# Patient Record
Sex: FEMALE | Race: WHITE | NOT HISPANIC OR LATINO | Employment: FULL TIME | URBAN - METROPOLITAN AREA
[De-identification: names, ages, dates, MRNs, and addresses within clinical notes are randomized per-mention and may not be internally consistent; named-entity substitution may affect disease eponyms.]

---

## 2021-07-07 ENCOUNTER — TELEPHONE (OUTPATIENT)
Dept: SCHEDULING | Facility: IMAGING CENTER | Age: 61
End: 2021-07-07

## 2021-07-23 ENCOUNTER — TELEPHONE (OUTPATIENT)
Dept: MEDICAL GROUP | Facility: MEDICAL CENTER | Age: 61
End: 2021-07-23

## 2021-07-23 NOTE — TELEPHONE ENCOUNTER
Phone Number Called: [phone]    Call outcome: Left detailed message for patient. Informed to call back with any additional questions.    Message: Reminding of apt

## 2021-07-26 ENCOUNTER — HOSPITAL ENCOUNTER (OUTPATIENT)
Facility: MEDICAL CENTER | Age: 61
End: 2021-07-26
Attending: FAMILY MEDICINE
Payer: COMMERCIAL

## 2021-07-26 ENCOUNTER — OFFICE VISIT (OUTPATIENT)
Dept: MEDICAL GROUP | Facility: MEDICAL CENTER | Age: 61
End: 2021-07-26
Payer: COMMERCIAL

## 2021-07-26 VITALS
SYSTOLIC BLOOD PRESSURE: 130 MMHG | DIASTOLIC BLOOD PRESSURE: 80 MMHG | OXYGEN SATURATION: 96 % | HEIGHT: 64 IN | RESPIRATION RATE: 16 BRPM | BODY MASS INDEX: 36.89 KG/M2 | TEMPERATURE: 97.3 F | WEIGHT: 216.05 LBS | HEART RATE: 63 BPM

## 2021-07-26 DIAGNOSIS — Z12.11 SCREENING FOR COLORECTAL CANCER: ICD-10-CM

## 2021-07-26 DIAGNOSIS — Z00.00 ANNUAL PHYSICAL EXAM: ICD-10-CM

## 2021-07-26 DIAGNOSIS — Z12.31 ENCOUNTER FOR SCREENING MAMMOGRAM FOR BREAST CANCER: ICD-10-CM

## 2021-07-26 DIAGNOSIS — Z12.4 SCREENING FOR MALIGNANT NEOPLASM OF CERVIX: ICD-10-CM

## 2021-07-26 DIAGNOSIS — Z13.220 SCREENING FOR LIPID DISORDERS: ICD-10-CM

## 2021-07-26 DIAGNOSIS — E66.09 CLASS 2 OBESITY DUE TO EXCESS CALORIES WITHOUT SERIOUS COMORBIDITY WITH BODY MASS INDEX (BMI) OF 37.0 TO 37.9 IN ADULT: ICD-10-CM

## 2021-07-26 DIAGNOSIS — Z12.12 SCREENING FOR COLORECTAL CANCER: ICD-10-CM

## 2021-07-26 DIAGNOSIS — Z13.0 SCREENING FOR DEFICIENCY ANEMIA: ICD-10-CM

## 2021-07-26 DIAGNOSIS — Z13.1 SCREENING FOR DIABETES MELLITUS: ICD-10-CM

## 2021-07-26 DIAGNOSIS — Z11.59 NEED FOR HEPATITIS C SCREENING TEST: ICD-10-CM

## 2021-07-26 PROCEDURE — 88175 CYTOPATH C/V AUTO FLUID REDO: CPT

## 2021-07-26 PROCEDURE — 99386 PREV VISIT NEW AGE 40-64: CPT | Performed by: FAMILY MEDICINE

## 2021-07-26 PROCEDURE — 87624 HPV HI-RISK TYP POOLED RSLT: CPT

## 2021-07-26 PROCEDURE — 99000 SPECIMEN HANDLING OFFICE-LAB: CPT | Performed by: FAMILY MEDICINE

## 2021-07-26 ASSESSMENT — ENCOUNTER SYMPTOMS
EYE DISCHARGE: 0
VOMITING: 0
DIZZINESS: 0
EYE PAIN: 0
HEMOPTYSIS: 0
EYE REDNESS: 0
CONSTIPATION: 0
WEIGHT LOSS: 0
HEADACHES: 0
DIARRHEA: 0
PALPITATIONS: 0
FEVER: 0
WHEEZING: 0
DEPRESSION: 0
NAUSEA: 0
CHILLS: 0
COUGH: 0
SINUS PAIN: 0
ABDOMINAL PAIN: 0
SENSORY CHANGE: 0
MYALGIAS: 0
SPUTUM PRODUCTION: 0
NERVOUS/ANXIOUS: 0
SHORTNESS OF BREATH: 0
FOCAL WEAKNESS: 0

## 2021-07-26 ASSESSMENT — PATIENT HEALTH QUESTIONNAIRE - PHQ9: CLINICAL INTERPRETATION OF PHQ2 SCORE: 0

## 2021-07-26 NOTE — PROGRESS NOTES
60 y.o. female here to establish care and annual routine gynecologic exam.    Chief Complaint.  Chief Complaint   Patient presents with   • Establish Care       History of present illness:    Ob-Gyn/ History:    OB History    Para Term  AB Living   0 0 0 0 0 0   SAB TAB Ectopic Molar Multiple Live Births   0 0 0 0 0 0        Last Pap Smear: 3 years  History of abnormal pap smears. None  Gyn Surgery: None  Sexual history: currently sexually active    Menopause at 38    Health Maintenance  Advanced directive: n/a  Osteoporosis Screen/ DEXA: n/a  PT/vit D for falls prevention: n/a   Cholesterol Screening: Ordered lipid panel  Diabetes Screening: Ordered A1c  AAA Screening (65 to 74 year male): n/a   Aspirin Use: n/a   Diet: She eats healthy diet  Exercise: She is physically active  Substance Abuse: None  Safe in relationship. Yes  Seat belts, bike helmet, gun safety discussed.  Sun protection used.    Cancer screening  Colorectal Cancer Screening: Due for colon cancer screening.  She reports that she was getting stool test before.  She never had colonoscopy.  Would like to do stool test.  Ordered Cologuard  Lung Cancer Screening: She does not smoke  Cervical Cancer Screening: Pap smear done today  Breast Cancer Screening: Ordered mammogram      Infectious disease screening/Immunizations  --STI Screening: None  --Practices safe sex.  --Immunizations: Due for tetanus and shingles vaccine.      Review of systems:    Review of Systems   Constitutional: Negative for chills, fever, malaise/fatigue and weight loss.   HENT: Negative for ear discharge, ear pain, hearing loss and sinus pain.    Eyes: Negative for pain, discharge and redness.   Respiratory: Negative for cough, hemoptysis, sputum production, shortness of breath and wheezing.    Cardiovascular: Negative for chest pain, palpitations and leg swelling.   Gastrointestinal: Negative for abdominal pain, constipation, diarrhea, nausea and vomiting.  "  Genitourinary: Negative for dysuria, frequency and urgency.   Musculoskeletal: Negative for joint pain and myalgias.   Skin: Negative for itching and rash.   Neurological: Negative for dizziness, sensory change, focal weakness and headaches.   Endo/Heme/Allergies: Negative for environmental allergies.   Psychiatric/Behavioral: Negative for depression. The patient is not nervous/anxious.           Past Medical, Surgical and Family History:    Past Medical History:   Diagnosis Date   • Asthma      Past Surgical History:   Procedure Laterality Date   • TONSILLECTOMY       Family History   Problem Relation Age of Onset   • Hypertension Mother    • Hypertension Father    • Parkinson's Disease Father    • Colon Cancer Father         in late 60s        Social History:    Social History     Tobacco Use   • Smoking status: Never Smoker   • Smokeless tobacco: Never Used   Substance Use Topics   • Alcohol use: Yes     Comment: 1 to 2 drinks a year   • Drug use: Never        Medications and Allergies:     No current outpatient medications on file.     No current facility-administered medications for this visit.        Not on File    Vitals:    /80   Pulse 63   Temp 36.3 °C (97.3 °F)   Resp 16   Ht 1.626 m (5' 4\")   Wt 98 kg (216 lb 0.8 oz)   SpO2 96%  Body mass index is 37.09 kg/m².    Physical Exam:   Physical Exam  Constitutional:       General: She is not in acute distress.  HENT:      Head: Normocephalic and atraumatic.      Right Ear: Ear canal and external ear normal. There is no impacted cerumen.      Left Ear: Ear canal and external ear normal. There is no impacted cerumen.   Eyes:      Conjunctiva/sclera: Conjunctivae normal.   Cardiovascular:      Rate and Rhythm: Normal rate and regular rhythm.      Heart sounds: Normal heart sounds. No murmur heard.   No friction rub. No gallop.    Pulmonary:      Effort: Pulmonary effort is normal. No respiratory distress.      Breath sounds: Normal breath sounds. No " wheezing or rales.   Musculoskeletal:         General: No deformity.      Cervical back: Neck supple.   Skin:     General: Skin is warm.      Findings: No rash.   Neurological:      General: No focal deficit present.      Mental Status: She is alert. Mental status is at baseline.      Gait: Gait is intact.   Psychiatric:         Mood and Affect: Mood and affect normal.         Judgment: Judgment normal.        Medical assistant Fátima present in room during pelvic exam.  Pelvic Exam -  Normal external genitalia with no lesions. Vaginal Mucosa:  normal vaginal mucosa . Cervix with no visible lesions. No cervical motion tenderness. Uterus is normal sized with no masses. No adnexal tenderness or enlargement appreciated. Thin Prep Pap is obtained, vaginal swab is obtained and specimen(s) sent to lab.        Assessment/Plan:    Trevon was seen today for establish care.    Diagnoses and all orders for this visit:    Annual physical exam  Class 2 obesity due to excess calories without serious comorbidity with body mass index (BMI) of 37.0 to 37.9 in adult  Eat healthy diet and do aerobic exercise 150 minutes in a week.  Ordered mammogram and Cologuard.  Ordered blood work.  Discussed with patient about tetanus and shingles vaccination.  Will give at upcoming visits.    -     Comp Metabolic Panel; Future  -     HEMOGLOBIN A1C; Future  -     Patient identified as having weight management issue.  Appropriate orders and counseling given.    Encounter for screening mammogram for breast cancer  -     MA-SCREENING MAMMO BILAT W/TOMOSYNTHESIS W/CAD; Future    Screening for colorectal cancer  -     COLOGUARD (FIT DNA)    Screening for deficiency anemia  -     CBC WITHOUT DIFFERENTIAL; Future    Screening for diabetes mellitus  -     Comp Metabolic Panel; Future  -     HEMOGLOBIN A1C; Future    Screening for lipid disorders  -     Lipid Profile; Future    Need for hepatitis C screening test  -     HEP C VIRUS ANTIBODY;  Future    Screening for malignant neoplasm of cervix  -     THINPREP PAP WITH HPV; Future        Follow up in 2 to 3 months for follow-up for blood work.

## 2021-07-27 LAB
CYTOLOGY REG CYTOL: NORMAL
HPV HR 12 DNA CVX QL NAA+PROBE: NEGATIVE
HPV16 DNA SPEC QL NAA+PROBE: NEGATIVE
HPV18 DNA SPEC QL NAA+PROBE: NEGATIVE
SPECIMEN SOURCE: NORMAL

## 2021-07-28 ENCOUNTER — TELEPHONE (OUTPATIENT)
Dept: MEDICAL GROUP | Facility: MEDICAL CENTER | Age: 61
End: 2021-07-28

## 2021-07-28 NOTE — TELEPHONE ENCOUNTER
VOIC[employer]  1. Caller Name: [email or web address] (Spouse)                        Call Back Number: [phone]    2. Message: Spouse left message requesting pt's recent Pap results. Pt is unable to get into [employer] account.     Tried to call back spouse, left message for spouse to call back at [phone].    Called pt, she was able to view results on [employer].

## 2021-08-12 ENCOUNTER — HOSPITAL ENCOUNTER (OUTPATIENT)
Dept: RADIOLOGY | Facility: MEDICAL CENTER | Age: 61
End: 2021-08-12
Attending: FAMILY MEDICINE
Payer: COMMERCIAL

## 2021-08-12 DIAGNOSIS — Z12.31 ENCOUNTER FOR SCREENING MAMMOGRAM FOR BREAST CANCER: ICD-10-CM

## 2021-08-12 PROCEDURE — 77063 BREAST TOMOSYNTHESIS BI: CPT

## 2021-08-19 ENCOUNTER — TELEPHONE (OUTPATIENT)
Dept: MEDICAL GROUP | Facility: MEDICAL CENTER | Age: 61
End: 2021-08-19

## 2021-08-19 NOTE — TELEPHONE ENCOUNTER
Spoke to patient about results per PCP request. Patient has already spoken to imaging department about schedule. They are waiting for results from [facility] to compare before redoing the imaging.     ----- Message from Sherwin Campos M.D. sent at 8/19/2021 12:34 PM PDT -----  Please call patient regarding abnormal mammogram results.  It showed calcification in the left breast and recommendation is to do further imaging.  I ordered diagnostic mammogram.  Please call patient to schedule that.

## 2021-08-23 ENCOUNTER — HOSPITAL ENCOUNTER (OUTPATIENT)
Dept: LAB | Facility: MEDICAL CENTER | Age: 61
End: 2021-08-23
Attending: FAMILY MEDICINE
Payer: COMMERCIAL

## 2021-08-23 DIAGNOSIS — Z13.220 SCREENING FOR LIPID DISORDERS: ICD-10-CM

## 2021-08-23 DIAGNOSIS — Z11.59 NEED FOR HEPATITIS C SCREENING TEST: ICD-10-CM

## 2021-08-23 DIAGNOSIS — Z13.0 SCREENING FOR DEFICIENCY ANEMIA: ICD-10-CM

## 2021-08-23 DIAGNOSIS — E66.09 CLASS 2 OBESITY DUE TO EXCESS CALORIES WITHOUT SERIOUS COMORBIDITY WITH BODY MASS INDEX (BMI) OF 37.0 TO 37.9 IN ADULT: ICD-10-CM

## 2021-08-23 DIAGNOSIS — Z13.1 SCREENING FOR DIABETES MELLITUS: ICD-10-CM

## 2021-08-23 LAB
ALBUMIN SERPL BCP-MCNC: 4.2 G/DL (ref 3.2–4.9)
ALBUMIN/GLOB SERPL: 1.2 G/DL
ALP SERPL-CCNC: 56 U/L (ref 30–99)
ALT SERPL-CCNC: 24 U/L (ref 2–50)
ANION GAP SERPL CALC-SCNC: 13 MMOL/L (ref 7–16)
AST SERPL-CCNC: 28 U/L (ref 12–45)
BILIRUB SERPL-MCNC: 0.3 MG/DL (ref 0.1–1.5)
BUN SERPL-MCNC: 14 MG/DL (ref 8–22)
CALCIUM SERPL-MCNC: 9.5 MG/DL (ref 8.5–10.5)
CHLORIDE SERPL-SCNC: 104 MMOL/L (ref 96–112)
CHOLEST SERPL-MCNC: 225 MG/DL (ref 100–199)
CO2 SERPL-SCNC: 24 MMOL/L (ref 20–33)
CREAT SERPL-MCNC: 0.97 MG/DL (ref 0.5–1.4)
ERYTHROCYTE [DISTWIDTH] IN BLOOD BY AUTOMATED COUNT: 43.7 FL (ref 35.9–50)
EST. AVERAGE GLUCOSE BLD GHB EST-MCNC: 108 MG/DL
FASTING STATUS PATIENT QL REPORTED: NORMAL
GLOBULIN SER CALC-MCNC: 3.5 G/DL (ref 1.9–3.5)
GLUCOSE SERPL-MCNC: 88 MG/DL (ref 65–99)
HBA1C MFR BLD: 5.4 % (ref 4–5.6)
HCT VFR BLD AUTO: 45.5 % (ref 37–47)
HCV AB SER QL: NORMAL
HDLC SERPL-MCNC: 39 MG/DL
HGB BLD-MCNC: 15.1 G/DL (ref 12–16)
LDLC SERPL CALC-MCNC: 165 MG/DL
MCH RBC QN AUTO: 30.7 PG (ref 27–33)
MCHC RBC AUTO-ENTMCNC: 33.2 G/DL (ref 33.6–35)
MCV RBC AUTO: 92.5 FL (ref 81.4–97.8)
PLATELET # BLD AUTO: 262 K/UL (ref 164–446)
PMV BLD AUTO: 9.9 FL (ref 9–12.9)
POTASSIUM SERPL-SCNC: 4.3 MMOL/L (ref 3.6–5.5)
PROT SERPL-MCNC: 7.7 G/DL (ref 6–8.2)
RBC # BLD AUTO: 4.92 M/UL (ref 4.2–5.4)
SODIUM SERPL-SCNC: 141 MMOL/L (ref 135–145)
TRIGL SERPL-MCNC: 107 MG/DL (ref 0–149)
WBC # BLD AUTO: 6.7 K/UL (ref 4.8–10.8)

## 2021-08-23 PROCEDURE — 36415 COLL VENOUS BLD VENIPUNCTURE: CPT

## 2021-08-23 PROCEDURE — 86803 HEPATITIS C AB TEST: CPT

## 2021-08-23 PROCEDURE — 80061 LIPID PANEL: CPT

## 2021-08-23 PROCEDURE — 85027 COMPLETE CBC AUTOMATED: CPT

## 2021-08-23 PROCEDURE — 83036 HEMOGLOBIN GLYCOSYLATED A1C: CPT

## 2021-08-23 PROCEDURE — 80053 COMPREHEN METABOLIC PANEL: CPT

## 2021-09-20 ENCOUNTER — HOSPITAL ENCOUNTER (OUTPATIENT)
Dept: RADIOLOGY | Facility: MEDICAL CENTER | Age: 61
End: 2021-09-20
Attending: FAMILY MEDICINE
Payer: COMMERCIAL

## 2021-09-20 ENCOUNTER — TELEPHONE (OUTPATIENT)
Dept: MEDICAL GROUP | Facility: MEDICAL CENTER | Age: 61
End: 2021-09-20

## 2021-09-20 DIAGNOSIS — R92.8 ABNORMAL MAMMOGRAM: ICD-10-CM

## 2021-09-20 DIAGNOSIS — R92.1 CALCIFICATION OF LEFT BREAST ON MAMMOGRAPHY: ICD-10-CM

## 2021-09-20 PROCEDURE — 77065 DX MAMMO INCL CAD UNI: CPT | Mod: LT

## 2021-09-21 ENCOUNTER — TELEPHONE (OUTPATIENT)
Dept: MEDICAL GROUP | Facility: MEDICAL CENTER | Age: 61
End: 2021-09-21

## 2021-09-21 NOTE — TELEPHONE ENCOUNTER
----- Message from Sherwin Campos M.D. sent at 9/20/2021 10:57 AM PDT -----  Please call patient regarding abnormal mammogram results.  It showed 2 areas of microcalcifications in left breast which needs stereotactic biopsy.  I ordered stereotactic biopsy.  Please recommend to call at [phone] to schedule for this biopsy.

## 2021-09-21 NOTE — TELEPHONE ENCOUNTER
Called pt to discuss mammogram results per PCP request.   Pt stated she had a biopsy done last year on these calcifications. Stated that radiology department is trying to get copies of her results from [facility], where she had last biopsy. Pt would like to hold off on scheduling further biopsy until radiology can obtain previous results. Pt educated about importance of biopsy, pt verbalized understanding. Pt had no further questions at this time. Advised pt to call if she had any further questions.

## 2021-10-13 ENCOUNTER — HOSPITAL ENCOUNTER (OUTPATIENT)
Dept: RADIOLOGY | Facility: MEDICAL CENTER | Age: 61
End: 2021-10-13
Payer: COMMERCIAL

## 2021-10-19 ENCOUNTER — TELEPHONE (OUTPATIENT)
Dept: MEDICAL GROUP | Facility: MEDICAL CENTER | Age: 61
End: 2021-10-19

## 2021-10-19 DIAGNOSIS — R92.1 CALCIFICATION OF LEFT BREAST ON MAMMOGRAPHY: ICD-10-CM

## 2021-10-19 NOTE — TELEPHONE ENCOUNTER
Spoke to patient about results per PCP request. She verbalized understanding. No questions at this time.    ----- Message from Sherwin Campos M.D. sent at 10/19/2021 11:27 AM PDT -----  Please call patient regarding addendum to her mammogram results.  Previous imagings were obtained and it showed calcifications were present in previous mammogram also.  Current recommendation is to do repeat left breast mammogram in 6 months and stereotactic biopsy is not recommended anymore.  I ordered repeat left breast mammogram to do in 6 months.

## 2023-01-10 ENCOUNTER — OFFICE VISIT (OUTPATIENT)
Dept: MEDICAL GROUP | Facility: MEDICAL CENTER | Age: 63
End: 2023-01-10
Payer: COMMERCIAL

## 2023-01-10 VITALS
SYSTOLIC BLOOD PRESSURE: 138 MMHG | WEIGHT: 222.66 LBS | OXYGEN SATURATION: 99 % | DIASTOLIC BLOOD PRESSURE: 70 MMHG | TEMPERATURE: 97.4 F | RESPIRATION RATE: 16 BRPM | BODY MASS INDEX: 39.45 KG/M2 | HEIGHT: 63 IN | HEART RATE: 66 BPM

## 2023-01-10 DIAGNOSIS — E78.2 MIXED HYPERLIPIDEMIA: ICD-10-CM

## 2023-01-10 DIAGNOSIS — Z11.4 SCREENING FOR HIV (HUMAN IMMUNODEFICIENCY VIRUS): ICD-10-CM

## 2023-01-10 DIAGNOSIS — R21 RASH: ICD-10-CM

## 2023-01-10 DIAGNOSIS — Z12.31 ENCOUNTER FOR SCREENING MAMMOGRAM FOR MALIGNANT NEOPLASM OF BREAST: ICD-10-CM

## 2023-01-10 PROCEDURE — 99214 OFFICE O/P EST MOD 30 MIN: CPT | Performed by: FAMILY MEDICINE

## 2023-01-10 RX ORDER — METHYLPREDNISOLONE 4 MG/1
TABLET ORAL
Qty: 21 TABLET | Refills: 0 | Status: SHIPPED | OUTPATIENT
Start: 2023-01-10 | End: 2023-02-10

## 2023-01-10 RX ORDER — TRIAMCINOLONE ACETONIDE 1 MG/G
1 CREAM TOPICAL 2 TIMES DAILY
Qty: 45 G | Refills: 2 | Status: SHIPPED | OUTPATIENT
Start: 2023-01-10 | End: 2023-02-10 | Stop reason: SDUPTHER

## 2023-01-10 ASSESSMENT — ENCOUNTER SYMPTOMS
CHILLS: 0
PALPITATIONS: 0
FEVER: 0

## 2023-01-10 ASSESSMENT — FIBROSIS 4 INDEX: FIB4 SCORE: 1.4

## 2023-01-10 ASSESSMENT — PATIENT HEALTH QUESTIONNAIRE - PHQ9: CLINICAL INTERPRETATION OF PHQ2 SCORE: 0

## 2023-01-10 NOTE — PROGRESS NOTES
"FAMILY MEDICINE VISIT                                                               Chief complaint::Diagnoses of Rash, Mixed hyperlipidemia, Encounter for screening mammogram for malignant neoplasm of breast, and Screening for HIV (human immunodeficiency virus) were pertinent to this visit.    History of present illness: Trevon Smith is a 62 y.o. female who presented for rash.    Problem   Mixed Hyperlipidemia    Last lipid panel showed elevated total cholesterol, LDL elevation also.  HDL is mildly low at 39.    Lab Results   Component Value Date/Time    CHOLSTRLTOT 225 (H) 08/23/2021 1117    TRIGLYCERIDE 107 08/23/2021 1117    HDL 39 (A) 08/23/2021 1117     (H) 08/23/2021 1117        Rash    She started experiencing rash a month ago.  Rash is at lower back which is extending towards front area.  She has rash over neck, head and right tibia.  Rash is red, itchy.  Denies using any new cream, soap, shampoo, detergent.  Has been using over-the-counter anti-itch medication which has been helping mildly with these symptoms.  Denies any similar previous rash            Review of systems:     Review of Systems   Constitutional:  Negative for chills and fever.   Cardiovascular:  Negative for chest pain, palpitations and leg swelling.   Skin:  Positive for itching and rash.      Medications and Allergies:     Current Outpatient Medications   Medication Sig Dispense Refill    methylPREDNISolone (MEDROL DOSEPAK) 4 MG Tablet Therapy Pack As directed on the packaging label. 21 Tablet 0    triamcinolone acetonide (KENALOG) 0.1 % Cream Apply 1 Application topically 2 times a day. 45 g 2     No current facility-administered medications for this visit.          Vitals:    /70   Pulse 66   Temp 36.3 °C (97.4 °F)   Resp 16   Ht 1.6 m (5' 3\")   Wt 101 kg (222 lb 10.6 oz)   SpO2 99%  Body mass index is 39.44 kg/m².    Physical Exam:     Physical Exam  Constitutional:       General: She is not in acute " distress.  HENT:      Head: Normocephalic and atraumatic.      Right Ear: External ear normal.      Left Ear: External ear normal.   Eyes:      Conjunctiva/sclera: Conjunctivae normal.   Cardiovascular:      Rate and Rhythm: Normal rate.   Pulmonary:      Effort: Pulmonary effort is normal. No respiratory distress.   Musculoskeletal:         General: No deformity.      Cervical back: Neck supple.   Skin:     Comments: Diffuse erythematous patches scratch marks over right lower leg, lower back, neck area.  Diffuse erythematous patches at scalp also   Neurological:      Mental Status: She is alert.      Gait: Gait is intact.   Psychiatric:         Mood and Affect: Mood and affect normal.         Judgment: Judgment normal.        Labs:  I reviewed with patient recent labs resulted on 8/23/2021    Assessment/Plan:         Problem List Items Addressed This Visit       Rash     New problem, unstable, appears allergic reaction, start Medrol Dosepak and prescribed Kenalog cream to be used twice daily at lower back and at right tibia.  Discussed if symptoms are not getting better, will refer her to dermatology.  Recommend to use Benadryl cream for itching         Relevant Medications    methylPREDNISolone (MEDROL DOSEPAK) 4 MG Tablet Therapy Pack    triamcinolone acetonide (KENALOG) 0.1 % Cream    Mixed hyperlipidemia     Chronic problem, unstable, healthy diet and exercise.  Recheck labs.         Relevant Orders    Comp Metabolic Panel    Lipid Profile     Other Visit Diagnoses       Encounter for screening mammogram for malignant neoplasm of breast        Relevant Orders    MA-SCREENING MAMMO BILAT W/TOMOSYNTHESIS W/CAD    Screening for HIV (human immunodeficiency virus)        Relevant Orders    HIV AG/AB COMBO ASSAY SCREENING           She had Cologuard test done, request records.  Discussed with patient about due vaccines including tetanus, shingles, flu vaccine.  She does not want to get any vaccines today.    Please  note that this dictation was created using voice recognition software. I have made every reasonable attempt to correct obvious errors, but I expect that there are errors of grammar and possibly content that I did not discover before finalizing the note.    Follow up in 1 month for lab follow-up and rash follow-up

## 2023-01-10 NOTE — ASSESSMENT & PLAN NOTE
New problem, unstable, appears allergic reaction, start Medrol Dosepak and prescribed Kenalog cream to be used twice daily at lower back and at right tibia.  Discussed if symptoms are not getting better, will refer her to dermatology.  Recommend to use Benadryl cream for itching

## 2023-01-23 ENCOUNTER — HOSPITAL ENCOUNTER (OUTPATIENT)
Dept: RADIOLOGY | Facility: MEDICAL CENTER | Age: 63
End: 2023-01-23
Attending: FAMILY MEDICINE
Payer: COMMERCIAL

## 2023-01-23 DIAGNOSIS — Z12.31 ENCOUNTER FOR SCREENING MAMMOGRAM FOR MALIGNANT NEOPLASM OF BREAST: ICD-10-CM

## 2023-01-23 PROCEDURE — 77063 BREAST TOMOSYNTHESIS BI: CPT

## 2023-02-03 ENCOUNTER — HOSPITAL ENCOUNTER (OUTPATIENT)
Dept: LAB | Facility: MEDICAL CENTER | Age: 63
End: 2023-02-03
Attending: FAMILY MEDICINE
Payer: COMMERCIAL

## 2023-02-03 DIAGNOSIS — Z11.4 SCREENING FOR HIV (HUMAN IMMUNODEFICIENCY VIRUS): ICD-10-CM

## 2023-02-03 DIAGNOSIS — E78.2 MIXED HYPERLIPIDEMIA: ICD-10-CM

## 2023-02-03 LAB
ALBUMIN SERPL BCP-MCNC: 4.1 G/DL (ref 3.2–4.9)
ALBUMIN/GLOB SERPL: 1.4 G/DL
ALP SERPL-CCNC: 55 U/L (ref 30–99)
ALT SERPL-CCNC: 21 U/L (ref 2–50)
ANION GAP SERPL CALC-SCNC: 10 MMOL/L (ref 7–16)
AST SERPL-CCNC: 24 U/L (ref 12–45)
BILIRUB SERPL-MCNC: 0.3 MG/DL (ref 0.1–1.5)
BUN SERPL-MCNC: 18 MG/DL (ref 8–22)
CALCIUM ALBUM COR SERPL-MCNC: 9.2 MG/DL (ref 8.5–10.5)
CALCIUM SERPL-MCNC: 9.3 MG/DL (ref 8.5–10.5)
CHLORIDE SERPL-SCNC: 107 MMOL/L (ref 96–112)
CHOLEST SERPL-MCNC: 212 MG/DL (ref 100–199)
CO2 SERPL-SCNC: 25 MMOL/L (ref 20–33)
CREAT SERPL-MCNC: 0.9 MG/DL (ref 0.5–1.4)
FASTING STATUS PATIENT QL REPORTED: NORMAL
GFR SERPLBLD CREATININE-BSD FMLA CKD-EPI: 71 ML/MIN/1.73 M 2
GLOBULIN SER CALC-MCNC: 3 G/DL (ref 1.9–3.5)
GLUCOSE SERPL-MCNC: 95 MG/DL (ref 65–99)
HDLC SERPL-MCNC: 41 MG/DL
LDLC SERPL CALC-MCNC: 155 MG/DL
POTASSIUM SERPL-SCNC: 4.8 MMOL/L (ref 3.6–5.5)
PROT SERPL-MCNC: 7.1 G/DL (ref 6–8.2)
SODIUM SERPL-SCNC: 142 MMOL/L (ref 135–145)
TRIGL SERPL-MCNC: 82 MG/DL (ref 0–149)

## 2023-02-03 PROCEDURE — 80053 COMPREHEN METABOLIC PANEL: CPT

## 2023-02-03 PROCEDURE — 87389 HIV-1 AG W/HIV-1&-2 AB AG IA: CPT

## 2023-02-03 PROCEDURE — 80061 LIPID PANEL: CPT

## 2023-02-03 PROCEDURE — 36415 COLL VENOUS BLD VENIPUNCTURE: CPT

## 2023-02-07 LAB — HIV 1+2 AB+HIV1 P24 AG SERPL QL IA: NORMAL

## 2023-02-10 ENCOUNTER — OFFICE VISIT (OUTPATIENT)
Dept: MEDICAL GROUP | Facility: MEDICAL CENTER | Age: 63
End: 2023-02-10
Payer: COMMERCIAL

## 2023-02-10 VITALS
WEIGHT: 228.18 LBS | HEIGHT: 64 IN | BODY MASS INDEX: 38.96 KG/M2 | RESPIRATION RATE: 12 BRPM | SYSTOLIC BLOOD PRESSURE: 130 MMHG | HEART RATE: 81 BPM | TEMPERATURE: 99 F | DIASTOLIC BLOOD PRESSURE: 70 MMHG | OXYGEN SATURATION: 97 %

## 2023-02-10 DIAGNOSIS — Z12.11 SCREENING FOR COLORECTAL CANCER: ICD-10-CM

## 2023-02-10 DIAGNOSIS — E78.2 MIXED HYPERLIPIDEMIA: ICD-10-CM

## 2023-02-10 DIAGNOSIS — Z12.12 SCREENING FOR COLORECTAL CANCER: ICD-10-CM

## 2023-02-10 DIAGNOSIS — R21 RASH: ICD-10-CM

## 2023-02-10 PROCEDURE — 99214 OFFICE O/P EST MOD 30 MIN: CPT | Performed by: FAMILY MEDICINE

## 2023-02-10 RX ORDER — TRIAMCINOLONE ACETONIDE 1 MG/G
1 CREAM TOPICAL 2 TIMES DAILY
Qty: 45 G | Refills: 2 | Status: SHIPPED | OUTPATIENT
Start: 2023-02-10

## 2023-02-10 ASSESSMENT — ENCOUNTER SYMPTOMS
PALPITATIONS: 0
CHILLS: 0
FEVER: 0

## 2023-02-10 ASSESSMENT — FIBROSIS 4 INDEX: FIB4 SCORE: 1.28

## 2023-02-10 NOTE — ASSESSMENT & PLAN NOTE
Chronic ongoing problem, unstable, although improved, seems to be allergic reaction, referral to dermatology placed if rash is not getting better

## 2023-02-10 NOTE — ASSESSMENT & PLAN NOTE
Chronic problem, unstable, although improving, continue to work on improving exercise and eat healthy diet.  Recheck labs in 6 months.

## 2023-02-10 NOTE — PROGRESS NOTES
FAMILY MEDICINE VISIT                                                               Chief complaint::Diagnoses of Rash, Mixed hyperlipidemia, and Screening for colorectal cancer were pertinent to this visit.    History of present illness: Trevon Smith is a 62 y.o. female who presented for lab follow-up and rash follow-up.    Problem   Mixed Hyperlipidemia    Recent labs showed improvement in total cholesterol and LDL level.  HDL improved to 41.  She reports that she eats healthy diet, she is going to increase her physical activity in next few months.      Lab Results   Component Value Date/Time    CHOLSTRLTOT 212 (H) 02/03/2023 0610    TRIGLYCERIDE 82 02/03/2023 0610    HDL 41 02/03/2023 0610     (H) 02/03/2023 0610        Rash    She started experiencing rash a month ago.  Rash is at lower back which is extending towards front area.  She has rash over neck, head and right tibia.  Rash is red, itchy.  Denies using any new cream, soap, shampoo, detergent.  Has been using over-the-counter anti-itch medication which has been helping mildly with these symptoms.  Denies any similar previous rash.    At last visit I prescribed her Medrol Dosepak and Kenalog.  She reports that her symptoms are improving but she still has a rash at her right tibia and in her lower back.  She looked into all the things at home and she has not tried any new detergent soap, new cream, shampoo.        Review of systems:     Review of Systems   Constitutional:  Negative for chills, fever and malaise/fatigue.   Cardiovascular:  Negative for chest pain, palpitations and leg swelling.   Skin:  Positive for itching and rash.      Medications and Allergies:     Current Outpatient Medications   Medication Sig Dispense Refill    triamcinolone acetonide (KENALOG) 0.1 % Cream Apply 1 Application topically 2 times a day. 45 g 2     No current facility-administered medications for this visit.          Vitals:    /70   Pulse 81   Temp  "37.2 °C (99 °F) (Temporal)   Resp 12   Ht 1.613 m (5' 3.5\")   Wt 104 kg (228 lb 2.8 oz)   SpO2 97%  Body mass index is 39.79 kg/m².    Physical Exam:     Physical Exam  Constitutional:       Appearance: Normal appearance. She is well-developed and well-groomed.   HENT:      Head: Normocephalic and atraumatic.      Right Ear: External ear normal.      Left Ear: External ear normal.   Eyes:      Conjunctiva/sclera: Conjunctivae normal.   Cardiovascular:      Rate and Rhythm: Normal rate.   Pulmonary:      Effort: Pulmonary effort is normal. No respiratory distress.   Musculoskeletal:      Cervical back: Neck supple.   Skin:     Findings: Rash present.      Comments: Diffuse erythematous patches over right tibia and maculopapular rash over lower back   Neurological:      Mental Status: She is alert.      Gait: Gait is intact.   Psychiatric:         Mood and Affect: Mood and affect normal.         Judgment: Judgment normal.        Labs:  I reviewed with patient recent labs resulted on 2/3/2023    Assessment/Plan:         Problem List Items Addressed This Visit       Rash     Chronic ongoing problem, unstable, although improved, seems to be allergic reaction, referral to dermatology placed if rash is not getting better         Relevant Medications    triamcinolone acetonide (KENALOG) 0.1 % Cream    Other Relevant Orders    Referral to Dermatology    Mixed hyperlipidemia     Chronic problem, unstable, although improving, continue to work on improving exercise and eat healthy diet.  Recheck labs in 6 months.         Relevant Orders    Comp Metabolic Panel    Lipid Profile     Other Visit Diagnoses       Screening for colorectal cancer        Relevant Orders    SANA (FIT DNA)           Discussed with patient about due vaccines, she does not want to get any vaccines today.    Please note that this dictation was created using voice recognition software. I have made every reasonable attempt to correct obvious errors, " but I expect that there are errors of grammar and possibly content that I did not discover before finalizing the note.    Follow up in 6 months for lab follow up.

## 2023-02-23 ENCOUNTER — APPOINTMENT (RX ONLY)
Dept: URBAN - METROPOLITAN AREA CLINIC 31 | Facility: CLINIC | Age: 63
Setting detail: DERMATOLOGY
End: 2023-02-23

## 2023-02-23 DIAGNOSIS — L85.3 XEROSIS CUTIS: ICD-10-CM

## 2023-02-23 DIAGNOSIS — I87.2 VENOUS INSUFFICIENCY (CHRONIC) (PERIPHERAL): ICD-10-CM

## 2023-02-23 DIAGNOSIS — L73.9 FOLLICULAR DISORDER, UNSPECIFIED: ICD-10-CM

## 2023-02-23 DIAGNOSIS — L738 OTHER SPECIFIED DISEASES OF HAIR AND HAIR FOLLICLES: ICD-10-CM

## 2023-02-23 DIAGNOSIS — L663 OTHER SPECIFIED DISEASES OF HAIR AND HAIR FOLLICLES: ICD-10-CM

## 2023-02-23 PROBLEM — L02.425 FURUNCLE OF RIGHT LOWER LIMB: Status: ACTIVE | Noted: 2023-02-23

## 2023-02-23 PROCEDURE — 99203 OFFICE O/P NEW LOW 30 MIN: CPT

## 2023-02-23 PROCEDURE — ? COUNSELING

## 2023-02-23 PROCEDURE — ? ADDITIONAL NOTES

## 2023-02-23 PROCEDURE — ? PRESCRIPTION

## 2023-02-23 RX ORDER — CEPHALEXIN 250 MG/1
TABLET ORAL
Qty: 42 | Refills: 0 | Status: ERX | COMMUNITY
Start: 2023-02-23

## 2023-02-23 RX ORDER — TRIAMCINOLONE ACETONIDE 1 MG/G
CREAM TOPICAL BID
Qty: 454 | Refills: 3 | Status: ERX | COMMUNITY
Start: 2023-02-23

## 2023-02-23 RX ADMIN — TRIAMCINOLONE ACETONIDE: 1 CREAM TOPICAL at 00:00

## 2023-02-23 RX ADMIN — CEPHALEXIN: 250 TABLET ORAL at 00:00

## 2023-02-23 ASSESSMENT — LOCATION DETAILED DESCRIPTION DERM
LOCATION DETAILED: RIGHT DISTAL PRETIBIAL REGION
LOCATION DETAILED: RIGHT MEDIAL UPPER BACK
LOCATION DETAILED: RIGHT PROXIMAL PRETIBIAL REGION

## 2023-02-23 ASSESSMENT — LOCATION ZONE DERM
LOCATION ZONE: TRUNK
LOCATION ZONE: LEG

## 2023-02-23 ASSESSMENT — LOCATION SIMPLE DESCRIPTION DERM
LOCATION SIMPLE: RIGHT PRETIBIAL REGION
LOCATION SIMPLE: RIGHT UPPER BACK

## 2023-02-23 NOTE — PROCEDURE: ADDITIONAL NOTES
Detail Level: Detailed
Render Risk Assessment In Note?: no
Additional Notes: Return in two weeks for follow up.\\nApply triamcinolone cream twice daily to itchy spots on back
Additional Notes: Wear compression stockings.\\nApply triamcinolone cream on red itchy spots.\\nApply moisturizer twice daily

## 2023-03-01 ENCOUNTER — PATIENT MESSAGE (OUTPATIENT)
Dept: HEALTH INFORMATION MANAGEMENT | Facility: OTHER | Age: 63
End: 2023-03-01

## 2023-03-24 ENCOUNTER — APPOINTMENT (RX ONLY)
Dept: URBAN - METROPOLITAN AREA CLINIC 31 | Facility: CLINIC | Age: 63
Setting detail: DERMATOLOGY
End: 2023-03-24

## 2023-03-24 DIAGNOSIS — L738 OTHER SPECIFIED DISEASES OF HAIR AND HAIR FOLLICLES: ICD-10-CM

## 2023-03-24 DIAGNOSIS — L73.9 FOLLICULAR DISORDER, UNSPECIFIED: ICD-10-CM

## 2023-03-24 DIAGNOSIS — I87.2 VENOUS INSUFFICIENCY (CHRONIC) (PERIPHERAL): ICD-10-CM

## 2023-03-24 DIAGNOSIS — L663 OTHER SPECIFIED DISEASES OF HAIR AND HAIR FOLLICLES: ICD-10-CM

## 2023-03-24 DIAGNOSIS — L85.3 XEROSIS CUTIS: ICD-10-CM

## 2023-03-24 PROBLEM — L02.821 FURUNCLE OF HEAD [ANY PART, EXCEPT FACE]: Status: ACTIVE | Noted: 2023-03-24

## 2023-03-24 PROCEDURE — ? COUNSELING

## 2023-03-24 PROCEDURE — ? PRESCRIPTION

## 2023-03-24 PROCEDURE — 99213 OFFICE O/P EST LOW 20 MIN: CPT

## 2023-03-24 PROCEDURE — ? ADDITIONAL NOTES

## 2023-03-24 RX ORDER — CEPHALEXIN 250 MG/1
TABLET ORAL
Qty: 42 | Refills: 3 | Status: ERX

## 2023-03-24 RX ORDER — CLINDAMYCIN PHOSPHATE 10 MG/G
GEL TOPICAL
Qty: 60 | Refills: 11 | Status: ERX | COMMUNITY
Start: 2023-03-24

## 2023-03-24 RX ADMIN — CLINDAMYCIN PHOSPHATE: 10 GEL TOPICAL at 00:00

## 2023-03-24 ASSESSMENT — LOCATION SIMPLE DESCRIPTION DERM
LOCATION SIMPLE: RIGHT UPPER BACK
LOCATION SIMPLE: SCALP
LOCATION SIMPLE: RIGHT PRETIBIAL REGION

## 2023-03-24 ASSESSMENT — LOCATION DETAILED DESCRIPTION DERM
LOCATION DETAILED: LEFT SUPERIOR PARIETAL SCALP
LOCATION DETAILED: RIGHT MEDIAL UPPER BACK
LOCATION DETAILED: LEFT CENTRAL POSTAURICULAR SKIN
LOCATION DETAILED: RIGHT DISTAL PRETIBIAL REGION

## 2023-03-24 ASSESSMENT — LOCATION ZONE DERM
LOCATION ZONE: LEG
LOCATION ZONE: TRUNK
LOCATION ZONE: SCALP

## 2023-03-24 NOTE — PROCEDURE: ADDITIONAL NOTES
Render Risk Assessment In Note?: no
Detail Level: Zone
Additional Notes: Clindagel to scalp bid prn flares, Keflex if flares do not respond to clindagel.
Additional Notes: Wear compression stockings.\\nApply triamcinolone cream on red itchy spots.\\nApply moisturizer twice daily
Detail Level: Detailed

## 2023-08-14 PROBLEM — U07.1 COVID-19: Status: ACTIVE | Noted: 2023-08-14

## 2023-08-25 ENCOUNTER — HOSPITAL ENCOUNTER (OUTPATIENT)
Dept: LAB | Facility: MEDICAL CENTER | Age: 63
End: 2023-08-25
Attending: FAMILY MEDICINE
Payer: COMMERCIAL

## 2023-08-25 DIAGNOSIS — E78.2 MIXED HYPERLIPIDEMIA: ICD-10-CM

## 2023-08-25 LAB
ALBUMIN SERPL BCP-MCNC: 4 G/DL (ref 3.2–4.9)
ALBUMIN/GLOB SERPL: 1.3 G/DL
ALP SERPL-CCNC: 51 U/L (ref 30–99)
ALT SERPL-CCNC: 19 U/L (ref 2–50)
ANION GAP SERPL CALC-SCNC: 12 MMOL/L (ref 7–16)
AST SERPL-CCNC: 27 U/L (ref 12–45)
BILIRUB SERPL-MCNC: 0.6 MG/DL (ref 0.1–1.5)
BUN SERPL-MCNC: 13 MG/DL (ref 8–22)
CALCIUM ALBUM COR SERPL-MCNC: 9.1 MG/DL (ref 8.5–10.5)
CALCIUM SERPL-MCNC: 9.1 MG/DL (ref 8.5–10.5)
CHLORIDE SERPL-SCNC: 104 MMOL/L (ref 96–112)
CHOLEST SERPL-MCNC: 185 MG/DL (ref 100–199)
CO2 SERPL-SCNC: 25 MMOL/L (ref 20–33)
CREAT SERPL-MCNC: 1.02 MG/DL (ref 0.5–1.4)
FASTING STATUS PATIENT QL REPORTED: NORMAL
GFR SERPLBLD CREATININE-BSD FMLA CKD-EPI: 61 ML/MIN/1.73 M 2
GLOBULIN SER CALC-MCNC: 3.2 G/DL (ref 1.9–3.5)
GLUCOSE SERPL-MCNC: 85 MG/DL (ref 65–99)
HDLC SERPL-MCNC: 30 MG/DL
LDLC SERPL CALC-MCNC: 129 MG/DL
POTASSIUM SERPL-SCNC: 3.8 MMOL/L (ref 3.6–5.5)
PROT SERPL-MCNC: 7.2 G/DL (ref 6–8.2)
SODIUM SERPL-SCNC: 141 MMOL/L (ref 135–145)
TRIGL SERPL-MCNC: 128 MG/DL (ref 0–149)

## 2023-08-25 PROCEDURE — 36415 COLL VENOUS BLD VENIPUNCTURE: CPT

## 2023-08-25 PROCEDURE — 80053 COMPREHEN METABOLIC PANEL: CPT

## 2023-08-25 PROCEDURE — 80061 LIPID PANEL: CPT

## 2023-09-05 ENCOUNTER — OFFICE VISIT (OUTPATIENT)
Dept: MEDICAL GROUP | Facility: MEDICAL CENTER | Age: 63
End: 2023-09-05
Payer: COMMERCIAL

## 2023-09-05 VITALS
HEART RATE: 63 BPM | WEIGHT: 216.05 LBS | DIASTOLIC BLOOD PRESSURE: 80 MMHG | HEIGHT: 63 IN | SYSTOLIC BLOOD PRESSURE: 152 MMHG | OXYGEN SATURATION: 95 % | BODY MASS INDEX: 38.28 KG/M2 | RESPIRATION RATE: 17 BRPM | TEMPERATURE: 97.5 F

## 2023-09-05 DIAGNOSIS — R94.4 DECREASED GFR: ICD-10-CM

## 2023-09-05 DIAGNOSIS — R03.0 ELEVATED BLOOD PRESSURE READING: ICD-10-CM

## 2023-09-05 DIAGNOSIS — N39.3 STRESS INCONTINENCE: ICD-10-CM

## 2023-09-05 DIAGNOSIS — E78.2 MIXED HYPERLIPIDEMIA: ICD-10-CM

## 2023-09-05 PROBLEM — U07.1 COVID-19: Status: RESOLVED | Noted: 2023-08-14 | Resolved: 2023-09-05

## 2023-09-05 PROCEDURE — 3077F SYST BP >= 140 MM HG: CPT | Performed by: FAMILY MEDICINE

## 2023-09-05 PROCEDURE — 99214 OFFICE O/P EST MOD 30 MIN: CPT | Performed by: FAMILY MEDICINE

## 2023-09-05 PROCEDURE — 3079F DIAST BP 80-89 MM HG: CPT | Performed by: FAMILY MEDICINE

## 2023-09-05 ASSESSMENT — ENCOUNTER SYMPTOMS
PALPITATIONS: 0
CHILLS: 0
FEVER: 0

## 2023-09-05 NOTE — ASSESSMENT & PLAN NOTE
New problem, unstable, recommended to monitor blood pressure daily for 1 month at home and bring log at next visit.  Reduce salt intake in diet, drink plenty of water, exercise 150 minutes in a week.

## 2023-09-05 NOTE — ASSESSMENT & PLAN NOTE
Chronic problem, mildly elevated at the level and low HDL.  Numbers are improving when compared to previous numbers.  Eat healthy diet and exercise 150 minutes in a week

## 2023-09-05 NOTE — PROGRESS NOTES
"FAMILY MEDICINE VISIT                                                               Chief complaint::Diagnoses of Mixed hyperlipidemia, Decreased GFR, Elevated blood pressure reading, and Stress incontinence were pertinent to this visit.    History of present illness: Trevon Smith is a 62 y.o. female who presented for lab follow up.    Problem   Decreased Gfr    Recent filtration rate came back at 61 which reduced from previous numbers.  She reports that she drinks a lot of fluids in a day but does have tea in that.     Elevated Blood Pressure Reading    Blood pressure is elevated today at 152/80, repeat came back almost the same also.  She has a lot of stress going on at home.  She is taking care of her uncle who has brain cancer.  She does not have any symptoms.     Stress Incontinence    She reports that she gets urinary incontinence whenever she coughs or sneezes.  Does not have any other urinary symptoms.     Mixed Hyperlipidemia    Recent labs showed improvement in total cholesterol and LDL level.  HDL low at 30.    Lab Results   Component Value Date/Time    CHOLSTRLTOT 185 08/25/2023 0610    TRIGLYCERIDE 128 08/25/2023 0610    HDL 30 (A) 08/25/2023 0610     (H) 08/25/2023 0610          Review of systems:     Review of Systems   Constitutional:  Negative for chills, fever and malaise/fatigue.   Cardiovascular:  Negative for chest pain, palpitations and leg swelling.        Medications and Allergies:     Current Outpatient Medications   Medication Sig Dispense Refill    triamcinolone acetonide (KENALOG) 0.1 % Cream Apply 1 Application topically 2 times a day. 45 g 2     No current facility-administered medications for this visit.          Vitals:    BP (!) 152/80   Pulse 63   Temp 36.4 °C (97.5 °F)   Resp 17   Ht 1.6 m (5' 3\")   Wt 98 kg (216 lb 0.8 oz)   SpO2 95%  Body mass index is 38.27 kg/m².    Physical Exam:     Physical Exam  Constitutional:       Appearance: Normal appearance. She is " well-developed and well-groomed.   HENT:      Head: Normocephalic and atraumatic.      Right Ear: External ear normal.      Left Ear: External ear normal.   Eyes:      General:         Right eye: No discharge.         Left eye: No discharge.      Conjunctiva/sclera: Conjunctivae normal.   Cardiovascular:      Rate and Rhythm: Normal rate.   Pulmonary:      Effort: Pulmonary effort is normal. No respiratory distress.   Musculoskeletal:      Cervical back: Neck supple.   Skin:     Findings: No rash.   Neurological:      Mental Status: She is alert.   Psychiatric:         Mood and Affect: Mood and affect normal.         Behavior: Behavior normal.          Labs:  I reviewed with patient recent labs resulted on 8/25/2023    Assessment/Plan:         Problem List Items Addressed This Visit       Mixed hyperlipidemia     Chronic problem, mildly elevated at the level and low HDL.  Numbers are improving when compared to previous numbers.  Eat healthy diet and exercise 150 minutes in a week         Relevant Orders    Lipid Profile    Decreased GFR     New problem, borderline filtration rate, recommended patient to drink more water than tea.  Recheck labs in 6-month.         Relevant Orders    Comp Metabolic Panel    Elevated blood pressure reading     New problem, unstable, recommended to monitor blood pressure daily for 1 month at home and bring log at next visit.  Reduce salt intake in diet, drink plenty of water, exercise 150 minutes in a week.         Stress incontinence     New problem, unstable, symptoms are consistent with stress incontinence.  Recommended patient to do Kegel exercises.  If not getting better then we will send her to urogynecology.          We discussed about due vaccines, she would like to hold onto vaccines currently as she has a lot going on.    Please note that this dictation was created using voice recognition software. I have made every reasonable attempt to correct obvious errors, but I expect  that there are errors of grammar and possibly content that I did not discover before finalizing the note.    Follow up in 1 month for BP follow up and in 6 month for lab follow up.

## 2023-09-05 NOTE — ASSESSMENT & PLAN NOTE
New problem, borderline filtration rate, recommended patient to drink more water than tea.  Recheck labs in 6-month.

## 2023-09-05 NOTE — ASSESSMENT & PLAN NOTE
New problem, unstable, symptoms are consistent with stress incontinence.  Recommended patient to do Kegel exercises.  If not getting better then we will send her to urogynecology.

## 2023-10-06 ENCOUNTER — APPOINTMENT (OUTPATIENT)
Dept: MEDICAL GROUP | Facility: MEDICAL CENTER | Age: 63
End: 2023-10-06
Payer: COMMERCIAL

## 2023-10-26 ENCOUNTER — HOSPITAL ENCOUNTER (OUTPATIENT)
Dept: LAB | Facility: MEDICAL CENTER | Age: 63
End: 2023-10-26
Attending: FAMILY MEDICINE
Payer: COMMERCIAL

## 2023-10-26 DIAGNOSIS — E78.2 MIXED HYPERLIPIDEMIA: ICD-10-CM

## 2023-10-26 DIAGNOSIS — R94.4 DECREASED GFR: ICD-10-CM

## 2023-10-26 LAB
ALBUMIN SERPL BCP-MCNC: 4.2 G/DL (ref 3.2–4.9)
ALBUMIN/GLOB SERPL: 1.2 G/DL
ALP SERPL-CCNC: 55 U/L (ref 30–99)
ALT SERPL-CCNC: 17 U/L (ref 2–50)
ANION GAP SERPL CALC-SCNC: 10 MMOL/L (ref 7–16)
AST SERPL-CCNC: 25 U/L (ref 12–45)
BILIRUB SERPL-MCNC: 0.4 MG/DL (ref 0.1–1.5)
BUN SERPL-MCNC: 17 MG/DL (ref 8–22)
CALCIUM ALBUM COR SERPL-MCNC: 9.3 MG/DL (ref 8.5–10.5)
CALCIUM SERPL-MCNC: 9.5 MG/DL (ref 8.5–10.5)
CHLORIDE SERPL-SCNC: 106 MMOL/L (ref 96–112)
CHOLEST SERPL-MCNC: 209 MG/DL (ref 100–199)
CO2 SERPL-SCNC: 26 MMOL/L (ref 20–33)
CREAT SERPL-MCNC: 1.07 MG/DL (ref 0.5–1.4)
FASTING STATUS PATIENT QL REPORTED: NORMAL
GFR SERPLBLD CREATININE-BSD FMLA CKD-EPI: 58 ML/MIN/1.73 M 2
GLOBULIN SER CALC-MCNC: 3.4 G/DL (ref 1.9–3.5)
GLUCOSE SERPL-MCNC: 91 MG/DL (ref 65–99)
HDLC SERPL-MCNC: 36 MG/DL
LDLC SERPL CALC-MCNC: 152 MG/DL
POTASSIUM SERPL-SCNC: 4.8 MMOL/L (ref 3.6–5.5)
PROT SERPL-MCNC: 7.6 G/DL (ref 6–8.2)
SODIUM SERPL-SCNC: 142 MMOL/L (ref 135–145)
TRIGL SERPL-MCNC: 103 MG/DL (ref 0–149)

## 2023-10-26 PROCEDURE — 80061 LIPID PANEL: CPT

## 2023-10-26 PROCEDURE — 80053 COMPREHEN METABOLIC PANEL: CPT

## 2023-10-26 PROCEDURE — 36415 COLL VENOUS BLD VENIPUNCTURE: CPT

## 2023-11-07 ENCOUNTER — OFFICE VISIT (OUTPATIENT)
Dept: MEDICAL GROUP | Facility: MEDICAL CENTER | Age: 63
End: 2023-11-07
Payer: COMMERCIAL

## 2023-11-07 VITALS
TEMPERATURE: 97.9 F | HEIGHT: 63 IN | SYSTOLIC BLOOD PRESSURE: 150 MMHG | BODY MASS INDEX: 37.5 KG/M2 | HEART RATE: 61 BPM | OXYGEN SATURATION: 97 % | WEIGHT: 211.64 LBS | DIASTOLIC BLOOD PRESSURE: 80 MMHG | RESPIRATION RATE: 16 BRPM

## 2023-11-07 DIAGNOSIS — Z13.820 ENCOUNTER FOR OSTEOPOROSIS SCREENING IN ASYMPTOMATIC POSTMENOPAUSAL PATIENT: ICD-10-CM

## 2023-11-07 DIAGNOSIS — N18.31 STAGE 3A CHRONIC KIDNEY DISEASE: ICD-10-CM

## 2023-11-07 DIAGNOSIS — E78.2 MIXED HYPERLIPIDEMIA: ICD-10-CM

## 2023-11-07 DIAGNOSIS — I10 PRIMARY HYPERTENSION: ICD-10-CM

## 2023-11-07 DIAGNOSIS — Z78.0 ENCOUNTER FOR OSTEOPOROSIS SCREENING IN ASYMPTOMATIC POSTMENOPAUSAL PATIENT: ICD-10-CM

## 2023-11-07 PROBLEM — N18.30 STAGE 3 CHRONIC KIDNEY DISEASE: Status: ACTIVE | Noted: 2023-09-05

## 2023-11-07 PROCEDURE — 3079F DIAST BP 80-89 MM HG: CPT | Performed by: FAMILY MEDICINE

## 2023-11-07 PROCEDURE — 99214 OFFICE O/P EST MOD 30 MIN: CPT | Performed by: FAMILY MEDICINE

## 2023-11-07 PROCEDURE — 3077F SYST BP >= 140 MM HG: CPT | Performed by: FAMILY MEDICINE

## 2023-11-07 RX ORDER — AMLODIPINE BESYLATE AND BENAZEPRIL HYDROCHLORIDE 2.5; 1 MG/1; MG/1
1 CAPSULE ORAL DAILY
Qty: 30 CAPSULE | Refills: 1 | Status: SHIPPED | OUTPATIENT
Start: 2023-11-07 | End: 2024-01-03

## 2023-11-07 RX ORDER — ROSUVASTATIN CALCIUM 10 MG/1
10 TABLET, COATED ORAL EVERY EVENING
Qty: 90 TABLET | Refills: 3 | Status: SHIPPED | OUTPATIENT
Start: 2023-11-07

## 2023-11-07 ASSESSMENT — ENCOUNTER SYMPTOMS
FEVER: 0
PALPITATIONS: 0
CHILLS: 0

## 2023-11-07 NOTE — PROGRESS NOTES
FAMILY MEDICINE VISIT                                                               Chief complaint::Diagnoses of Mixed hyperlipidemia, Stage 3a chronic kidney disease (HCC), Primary hypertension, and Encounter for osteoporosis screening in asymptomatic postmenopausal patient were pertinent to this visit.    History of present illness: Trevon Smith is a 63 y.o. female who presented for lab follow-up and blood pressure    Problem   Stage 3 Chronic Kidney Disease (Hcc)    Recent filtration rate came back at 58% which reduced from previous numbers.  She is currently not on any NSAIDs.    Lab Results   Component Value Date/Time    CREATININE 1.07 10/26/2023 06:29 AM         Primary Hypertension    Blood pressure is elevated, repeat also came back elevated at 150/80.  Her home blood pressure readings are also elevated.  She has a lot of stress going on at home.  She is taking care of her uncle who has brain cancer.     Mixed Hyperlipidemia        The 10-year ASCVD risk score (Suellen MUÑOZ, et al., 2019) is: 11.6%    Recent cholesterol panel came back elevated.     Lab Results   Component Value Date/Time    CHOLSTRLTOT 209 (H) 10/26/2023 0629    TRIGLYCERIDE 103 10/26/2023 0629    HDL 36 (A) 10/26/2023 0629     (H) 10/26/2023 0629               Review of systems:     Review of Systems   Constitutional:  Negative for chills and fever.   Cardiovascular:  Negative for chest pain, palpitations and leg swelling.        Medications and Allergies:     Current Outpatient Medications   Medication Sig Dispense Refill    amlodipine-benazepril (LOTREL) 2.5-10 MG oral capsule Take 1 Capsule by mouth every day. 30 Capsule 1    rosuvastatin (CRESTOR) 10 MG Tab Take 1 Tablet by mouth every evening. 90 Tablet 3    triamcinolone acetonide (KENALOG) 0.1 % Cream Apply 1 Application topically 2 times a day. 45 g 2     No current facility-administered medications for this visit.          Vitals:    BP (!) 150/80   Pulse 61   Temp  "36.6 °C (97.9 °F)   Resp 16   Ht 1.6 m (5' 3\")   Wt 96 kg (211 lb 10.3 oz)   SpO2 97%  Body mass index is 37.49 kg/m².    Physical Exam:     Physical Exam  Constitutional:       Appearance: Normal appearance. She is well-developed and well-groomed.   HENT:      Head: Normocephalic and atraumatic.      Right Ear: External ear normal.      Left Ear: External ear normal.   Eyes:      General:         Right eye: No discharge.         Left eye: No discharge.      Conjunctiva/sclera: Conjunctivae normal.   Cardiovascular:      Rate and Rhythm: Normal rate.   Pulmonary:      Effort: Pulmonary effort is normal. No respiratory distress.   Musculoskeletal:      Cervical back: Neck supple.   Skin:     Findings: No rash.   Neurological:      Mental Status: She is alert.   Psychiatric:         Mood and Affect: Mood and affect normal.         Behavior: Behavior normal.          Labs:  I reviewed with patient recent labs resulted on 10/26/2023    Assessment/Plan:         Problem List Items Addressed This Visit       Mixed hyperlipidemia     Chronic problem, unstable, start Crestor 10 mg daily.  Recheck labs in 4 months.         Relevant Medications    amlodipine-benazepril (LOTREL) 2.5-10 MG oral capsule    rosuvastatin (CRESTOR) 10 MG Tab    Other Relevant Orders    Lipid Profile    Stage 3 chronic kidney disease (HCC)     Chronic problem, unstable, drink more water, avoid nephrotoxic agents.  Recheck labs in 4-month         Relevant Orders    Comp Metabolic Panel    Primary hypertension     New problem, unstable, start amlodipine benazepril combination.  Follow-up in 1 to 2 month.  Monitor blood pressure at home.  Recommended DASH diet.         Relevant Medications    amlodipine-benazepril (LOTREL) 2.5-10 MG oral capsule    rosuvastatin (CRESTOR) 10 MG Tab     Other Visit Diagnoses       Encounter for osteoporosis screening in asymptomatic postmenopausal patient        Relevant Orders    DS-BONE DENSITY STUDY (DEXA)         "     Please note that this dictation was created using voice recognition software. I have made every reasonable attempt to correct obvious errors, but I expect that there are errors of grammar and possibly content that I did not discover before finalizing the note.    Follow up in 2 months for blood pressure follow-up

## 2023-11-07 NOTE — ASSESSMENT & PLAN NOTE
New problem, unstable, start amlodipine benazepril combination.  Follow-up in 1 to 2 month.  Monitor blood pressure at home.  Recommended DASH diet.

## 2024-01-03 DIAGNOSIS — I10 PRIMARY HYPERTENSION: ICD-10-CM

## 2024-01-03 RX ORDER — AMLODIPINE BESYLATE AND BENAZEPRIL HYDROCHLORIDE 2.5; 1 MG/1; MG/1
1 CAPSULE ORAL DAILY
Qty: 90 CAPSULE | Refills: 0 | Status: SHIPPED | OUTPATIENT
Start: 2024-01-03

## 2024-01-16 ENCOUNTER — OFFICE VISIT (OUTPATIENT)
Dept: MEDICAL GROUP | Facility: MEDICAL CENTER | Age: 64
End: 2024-01-16
Payer: COMMERCIAL

## 2024-01-16 VITALS
HEIGHT: 63 IN | HEART RATE: 75 BPM | DIASTOLIC BLOOD PRESSURE: 70 MMHG | SYSTOLIC BLOOD PRESSURE: 144 MMHG | WEIGHT: 213 LBS | RESPIRATION RATE: 16 BRPM | OXYGEN SATURATION: 97 % | TEMPERATURE: 97.4 F | BODY MASS INDEX: 37.74 KG/M2

## 2024-01-16 DIAGNOSIS — I10 PRIMARY HYPERTENSION: ICD-10-CM

## 2024-01-16 DIAGNOSIS — E78.2 MIXED HYPERLIPIDEMIA: ICD-10-CM

## 2024-01-16 PROCEDURE — 99214 OFFICE O/P EST MOD 30 MIN: CPT | Performed by: FAMILY MEDICINE

## 2024-01-16 PROCEDURE — 3077F SYST BP >= 140 MM HG: CPT | Performed by: FAMILY MEDICINE

## 2024-01-16 PROCEDURE — 3078F DIAST BP <80 MM HG: CPT | Performed by: FAMILY MEDICINE

## 2024-01-16 ASSESSMENT — PATIENT HEALTH QUESTIONNAIRE - PHQ9: CLINICAL INTERPRETATION OF PHQ2 SCORE: 0

## 2024-01-16 ASSESSMENT — ENCOUNTER SYMPTOMS
FEVER: 0
PALPITATIONS: 0
CHILLS: 0

## 2024-01-16 NOTE — ASSESSMENT & PLAN NOTE
Chronic problem, unstable, recommended patient to take 1-1/2 tablet to reduce blood pressure.  Goal blood pressure is between 1 20-1 30/70-80.  She will let us know if she needs refill for this prescription

## 2024-01-16 NOTE — ASSESSMENT & PLAN NOTE
Chronic problem, unstable, at last visit I started her on Crestor 10 mg daily.  I ordered labs for her to do before next visit at previous visit.  Recommended patient to do blood test before next visit.

## 2024-03-11 ENCOUNTER — HOSPITAL ENCOUNTER (OUTPATIENT)
Dept: LAB | Facility: MEDICAL CENTER | Age: 64
End: 2024-03-11
Attending: FAMILY MEDICINE
Payer: COMMERCIAL

## 2024-03-11 DIAGNOSIS — E78.2 MIXED HYPERLIPIDEMIA: ICD-10-CM

## 2024-03-11 DIAGNOSIS — N18.31 STAGE 3A CHRONIC KIDNEY DISEASE: ICD-10-CM

## 2024-03-11 LAB
ALBUMIN SERPL BCP-MCNC: 4.5 G/DL (ref 3.2–4.9)
ALBUMIN/GLOB SERPL: 1.3 G/DL
ALP SERPL-CCNC: 57 U/L (ref 30–99)
ALT SERPL-CCNC: 17 U/L (ref 2–50)
ANION GAP SERPL CALC-SCNC: 12 MMOL/L (ref 7–16)
AST SERPL-CCNC: 30 U/L (ref 12–45)
BILIRUB SERPL-MCNC: 0.4 MG/DL (ref 0.1–1.5)
BUN SERPL-MCNC: 12 MG/DL (ref 8–22)
CALCIUM ALBUM COR SERPL-MCNC: 9.2 MG/DL (ref 8.5–10.5)
CALCIUM SERPL-MCNC: 9.6 MG/DL (ref 8.5–10.5)
CHLORIDE SERPL-SCNC: 106 MMOL/L (ref 96–112)
CHOLEST SERPL-MCNC: 144 MG/DL (ref 100–199)
CO2 SERPL-SCNC: 24 MMOL/L (ref 20–33)
CREAT SERPL-MCNC: 1.02 MG/DL (ref 0.5–1.4)
GFR SERPLBLD CREATININE-BSD FMLA CKD-EPI: 61 ML/MIN/1.73 M 2
GLOBULIN SER CALC-MCNC: 3.4 G/DL (ref 1.9–3.5)
GLUCOSE SERPL-MCNC: 81 MG/DL (ref 65–99)
HDLC SERPL-MCNC: 45 MG/DL
LDLC SERPL CALC-MCNC: 81 MG/DL
POTASSIUM SERPL-SCNC: 4.4 MMOL/L (ref 3.6–5.5)
PROT SERPL-MCNC: 7.9 G/DL (ref 6–8.2)
SODIUM SERPL-SCNC: 142 MMOL/L (ref 135–145)
TRIGL SERPL-MCNC: 92 MG/DL (ref 0–149)

## 2024-03-11 PROCEDURE — 80061 LIPID PANEL: CPT

## 2024-03-11 PROCEDURE — 36415 COLL VENOUS BLD VENIPUNCTURE: CPT

## 2024-03-11 PROCEDURE — 80053 COMPREHEN METABOLIC PANEL: CPT

## 2024-03-14 ENCOUNTER — OFFICE VISIT (OUTPATIENT)
Dept: MEDICAL GROUP | Facility: MEDICAL CENTER | Age: 64
End: 2024-03-14
Payer: COMMERCIAL

## 2024-03-14 VITALS
OXYGEN SATURATION: 96 % | SYSTOLIC BLOOD PRESSURE: 122 MMHG | RESPIRATION RATE: 17 BRPM | BODY MASS INDEX: 37.11 KG/M2 | HEART RATE: 78 BPM | WEIGHT: 209.44 LBS | TEMPERATURE: 97.7 F | DIASTOLIC BLOOD PRESSURE: 60 MMHG | HEIGHT: 63 IN

## 2024-03-14 DIAGNOSIS — N18.2 STAGE 2 CHRONIC KIDNEY DISEASE: ICD-10-CM

## 2024-03-14 DIAGNOSIS — E78.2 MIXED HYPERLIPIDEMIA: ICD-10-CM

## 2024-03-14 DIAGNOSIS — E66.9 OBESITY (BMI 30-39.9): ICD-10-CM

## 2024-03-14 DIAGNOSIS — I10 PRIMARY HYPERTENSION: ICD-10-CM

## 2024-03-14 PROCEDURE — 99214 OFFICE O/P EST MOD 30 MIN: CPT | Performed by: FAMILY MEDICINE

## 2024-03-14 PROCEDURE — 3074F SYST BP LT 130 MM HG: CPT | Performed by: FAMILY MEDICINE

## 2024-03-14 PROCEDURE — 3078F DIAST BP <80 MM HG: CPT | Performed by: FAMILY MEDICINE

## 2024-03-14 ASSESSMENT — ENCOUNTER SYMPTOMS
PALPITATIONS: 0
CHILLS: 0
FEVER: 0

## 2024-03-14 NOTE — PROGRESS NOTES
Verbal consent was acquired by the patient to use [employer] ambient listening note generation during this visit.      Trevon was seen today for follow-up and lab results.    Diagnoses and all orders for this visit:    Primary hypertension  -     Comp Metabolic Panel; Future    Mixed hyperlipidemia  -     Lipid Profile; Future    Stage 2 chronic kidney disease  -     Comp Metabolic Panel; Future    Obesity (BMI 30-39.9)  -     Patient identified as having weight management issue.  Appropriate orders and counseling given.                  Assessment & Plan  1. Primary hypertension.  This is a chronic condition, stable. Her blood pressure today is 122/60. She will continue amlodipine-benazepril combination medication 1 tablet daily.    2. Mixed hyperlipidemia.  Chronic condition, stable. Recent cholesterol number showed significant improvement in cholesterol numbers. She is able to tolerate Crestor medication well without any side effects. She will continue Crestor 10 mg daily. I will recheck labs in 1 year.    3. CKD stage 2:  Chronic condition, stable.  Her filtration rate of kidneys improved to 61 percent from 58 percent. She is drinking a lot of water. She will continue to drink water. She will avoid nephrotoxic agents like ibuprofen or Aleve. If she has pain, she will take Tylenol as needed for pain.    4. Health maintenance.  She would like to do mammogram next year. I ordered a bone scan for the patient, which she can do at any time this year. She would like to hold on to getting any vaccines.    Follow-up  The patient will follow up in 1 year for annual physical, sooner as needed.          Chief complaint::Diagnoses of Primary hypertension, Mixed hyperlipidemia, Stage 2 chronic kidney disease, and Obesity (BMI 30-39.9) were pertinent to this visit.      History of Present Illness  The patient is a 63-year-old female who is here for blood test follow-up as well as medication follow-up.    Her blood pressure  "has been really good in the 120s/60s. She attributes her elevated blood pressure to rushing and stress.    She is on Crestor 10 mg and denies any major side effects.    She drinks a 50 to 60-ounce bottle of water on the way to work and 1 on the way home from work. She attributes her improved kidney function to stress.   She denies any family history of breast cancer, personal history of breast cancer.      Review of Systems   Constitutional:  Negative for chills and fever.   Cardiovascular:  Negative for chest pain, palpitations and leg swelling.          Medications and Allergies:     Current Outpatient Medications   Medication Sig Dispense Refill    amlodipine-benazepril (LOTREL) 2.5-10 MG oral capsule Take 1 capsule by mouth once daily 90 Capsule 0    rosuvastatin (CRESTOR) 10 MG Tab Take 1 Tablet by mouth every evening. 90 Tablet 3    triamcinolone acetonide (KENALOG) 0.1 % Cream Apply 1 Application topically 2 times a day. 45 g 2     No current facility-administered medications for this visit.       /60   Pulse 78   Temp 36.5 °C (97.7 °F)   Resp 17   Ht 1.6 m (5' 3\")   Wt 95 kg (209 lb 7 oz)   SpO2 96% , Body mass index is 37.1 kg/m².      Physical Exam  Constitutional:       Appearance: Normal appearance. She is well-developed and well-groomed.   HENT:      Head: Normocephalic and atraumatic.      Right Ear: External ear normal.      Left Ear: External ear normal.   Eyes:      General:         Right eye: No discharge.         Left eye: No discharge.      Conjunctiva/sclera: Conjunctivae normal.   Cardiovascular:      Rate and Rhythm: Normal rate.   Pulmonary:      Effort: Pulmonary effort is normal. No respiratory distress.   Musculoskeletal:      Cervical back: Neck supple.   Skin:     Findings: No rash.   Neurological:      Mental Status: She is alert.   Psychiatric:         Mood and Affect: Mood and affect normal.         Behavior: Behavior normal.              Results  Laboratory Studies  Labs " from 03/11/2024 were reviewed with the patient.          Please note that this dictation was created using voice recognition software. I have made every reasonable attempt to correct obvious errors, but I expect that there are errors of grammar and possibly content that I did not discover before finalizing the note.

## 2024-04-02 DIAGNOSIS — I10 PRIMARY HYPERTENSION: ICD-10-CM

## 2024-04-02 RX ORDER — AMLODIPINE BESYLATE AND BENAZEPRIL HYDROCHLORIDE 2.5; 1 MG/1; MG/1
1 CAPSULE ORAL DAILY
Qty: 90 CAPSULE | Refills: 3 | Status: SHIPPED | OUTPATIENT
Start: 2024-04-02

## 2024-04-02 NOTE — TELEPHONE ENCOUNTER
Received request via: Patient    Was the patient seen in the last year in this department? Yes    Does the patient have an active prescription (recently filled or refills available) for medication(s) requested? No      Does the patient have [facility] and need 100 day supply (blood pressure, diabetes and cholesterol meds only)? Patient does not have SCP

## 2024-08-05 ENCOUNTER — HOSPITAL ENCOUNTER (OUTPATIENT)
Dept: RADIOLOGY | Facility: MEDICAL CENTER | Age: 64
End: 2024-08-05
Attending: FAMILY MEDICINE
Payer: COMMERCIAL

## 2024-08-05 DIAGNOSIS — Z78.0 ENCOUNTER FOR OSTEOPOROSIS SCREENING IN ASYMPTOMATIC POSTMENOPAUSAL PATIENT: ICD-10-CM

## 2024-08-05 DIAGNOSIS — Z13.820 ENCOUNTER FOR OSTEOPOROSIS SCREENING IN ASYMPTOMATIC POSTMENOPAUSAL PATIENT: ICD-10-CM

## 2024-08-05 PROCEDURE — 77080 DXA BONE DENSITY AXIAL: CPT

## 2024-11-21 DIAGNOSIS — E78.2 MIXED HYPERLIPIDEMIA: ICD-10-CM

## 2024-11-21 RX ORDER — ROSUVASTATIN CALCIUM 10 MG/1
10 TABLET, COATED ORAL EVERY EVENING
Qty: 30 TABLET | Refills: 0 | Status: SHIPPED | OUTPATIENT
Start: 2024-11-21

## 2024-11-21 NOTE — TELEPHONE ENCOUNTER
Received request via: Pharmacy    Was the patient seen in the last year in this department? Yes    Does the patient have an active prescription (recently filled or refills available) for medication(s) requested? No    Pharmacy Name: [facility]    Does the patient have [facility] Plus and need 100-day supply? (This applies to ALL medications) Patient does not have SCP

## 2024-12-24 DIAGNOSIS — E78.2 MIXED HYPERLIPIDEMIA: ICD-10-CM

## 2024-12-24 RX ORDER — ROSUVASTATIN CALCIUM 10 MG/1
10 TABLET, COATED ORAL EVERY EVENING
Qty: 90 TABLET | Refills: 1 | Status: SHIPPED | OUTPATIENT
Start: 2024-12-24

## 2024-12-24 NOTE — TELEPHONE ENCOUNTER
Received request via: Patient    Was the patient seen in the last year in this department? Yes    Does the patient have an active prescription (recently filled or refills available) for medication(s) requested? No    Pharmacy Name: [facility]    Does the patient have [facility] Plus and need 100-day supply? (This applies to ALL medications) Patient does not have SCP      rosuvastatin (CRESTOR) 10 MG Tab

## 2025-03-14 ENCOUNTER — OFFICE VISIT (OUTPATIENT)
Dept: MEDICAL GROUP | Facility: MEDICAL CENTER | Age: 65
End: 2025-03-14
Payer: COMMERCIAL

## 2025-03-14 VITALS
HEART RATE: 60 BPM | TEMPERATURE: 97.4 F | SYSTOLIC BLOOD PRESSURE: 138 MMHG | BODY MASS INDEX: 35.57 KG/M2 | WEIGHT: 208.34 LBS | HEIGHT: 64 IN | DIASTOLIC BLOOD PRESSURE: 60 MMHG | OXYGEN SATURATION: 98 %

## 2025-03-14 DIAGNOSIS — E78.2 MIXED HYPERLIPIDEMIA: ICD-10-CM

## 2025-03-14 DIAGNOSIS — Z12.31 ENCOUNTER FOR SCREENING MAMMOGRAM FOR BREAST CANCER: ICD-10-CM

## 2025-03-14 DIAGNOSIS — E66.9 OBESITY (BMI 30-39.9): ICD-10-CM

## 2025-03-14 DIAGNOSIS — I10 PRIMARY HYPERTENSION: ICD-10-CM

## 2025-03-14 DIAGNOSIS — Z00.00 ANNUAL PHYSICAL EXAM: ICD-10-CM

## 2025-03-14 PROCEDURE — 99397 PER PM REEVAL EST PAT 65+ YR: CPT | Performed by: FAMILY MEDICINE

## 2025-03-14 PROCEDURE — 99214 OFFICE O/P EST MOD 30 MIN: CPT | Mod: 25 | Performed by: FAMILY MEDICINE

## 2025-03-14 PROCEDURE — 3075F SYST BP GE 130 - 139MM HG: CPT | Performed by: FAMILY MEDICINE

## 2025-03-14 PROCEDURE — 3078F DIAST BP <80 MM HG: CPT | Performed by: FAMILY MEDICINE

## 2025-03-14 ASSESSMENT — ENCOUNTER SYMPTOMS
SINUS PAIN: 0
EYE PAIN: 0
HEMOPTYSIS: 0
EYE DISCHARGE: 0
SORE THROAT: 0
DEPRESSION: 0
FEVER: 0
NAUSEA: 0
DIARRHEA: 0
DIZZINESS: 0
SHORTNESS OF BREATH: 0
FOCAL WEAKNESS: 0
MYALGIAS: 0
SENSORY CHANGE: 0
VOMITING: 0
CHILLS: 0
WHEEZING: 0
CONSTIPATION: 0
EYE REDNESS: 0
SPUTUM PRODUCTION: 0
HEADACHES: 0
COUGH: 0
ABDOMINAL PAIN: 0
NERVOUS/ANXIOUS: 0
PALPITATIONS: 0

## 2025-03-14 ASSESSMENT — PATIENT HEALTH QUESTIONNAIRE - PHQ9: CLINICAL INTERPRETATION OF PHQ2 SCORE: 0

## 2025-03-14 NOTE — PROGRESS NOTES
Verbal consent was acquired by the patient to use [employer] ambient listening note generation during this visit     Assessment/Plan:    Trevon was seen today for annual exam.    Diagnoses and all orders for this visit:    Annual physical exam    Encounter for screening mammogram for breast cancer  -     MA-SCREENING MAMMO BILAT W/TOMOSYNTHESIS W/CAD; Future    Primary hypertension  -     Comp Metabolic Panel; Future  -     HEMOGLOBIN A1C; Future    Mixed hyperlipidemia  -     Comp Metabolic Panel; Future  -     Lipid Profile; Future  -     TSH; Future  -     FREE THYROXINE; Future    Obesity (BMI 30-39.9)  -     Patient identified as having weight management issue.  Appropriate orders and counseling given.         Assessment & Plan  1. Hypertension:  Chronic condition, borderline control  - Blood pressure elevated today, potentially due to recent physical activity and caffeine intake  - Currently taking amlodipine benazepril combination  - Re-evaluation of blood pressure during this visit    2.  Obesity  Chronic condition, improving  - Lost 25 pounds over the past month and a half  - Concerned about regaining weight during the winter  - Encouraged to continue diet and exercise regimen    3.  Hyperlipidemia  Chronic condition, stable, continue Crestor 10 mg daily.  Recheck labs.    Lab Results   Component Value Date/Time    CHOLSTRLTOT 144 2024 0622    TRIGLYCERIDE 92 2024 0622    HDL 45 2024 0622    LDL 81 2024 0622      Annual physical  - Due for a mammogram, which I placed orders for.  - Lab order for routine blood work, including thyroid function tests, will be placed as the previous order has   - Due for pneumonia and tetanus vaccinations, which can be administered today  - Shingles and COVID-19 vaccines not available at this location  .  Patient declined vaccines.  Up-to-date further screenings  Anticipatory guidance discussed below in the note      Follow-up in 3 months for  lab follow-up.    Chief Complaint.    Chief Complaint   Patient presents with    Annual Exam     Discuss mammogram         History of Present Illness  The patient is a 64-year-old female who presents for an annual physical exam.    Blood Pressure  - Reports a transient elevation in her blood pressure  - Attributes elevation to recent physical exertion while assisting her brother with packing his house  - Mentions consuming a significant amount of tea this morning  - Current antihypertensive regimen includes amlodipine benazepril    Weight Management  - Actively engaged in weight management through dietary modifications and regular exercise  - Achieved a weight loss of 25 pounds over the past month and a half  - Expresses concern about potential weight gain during the winter season due to decreased physical activity    Preventive Care  - Due for a mammogram, which has already been scheduled  - Had a bone scan last year, which was normal  - Up to date on her Cologuard test  - Due for pneumonia and tetanus vaccines        Health Maintenance  Advanced directive: Get advanced directives  Osteoporosis Screen/ DEXA: Recent bone scan normal.  PT/vit D for falls prevention: n/a   Cholesterol Screening:   Lab Results   Component Value Date/Time    CHOLSTRLTOT 144 03/11/2024 0622    TRIGLYCERIDE 92 03/11/2024 0622    HDL 45 03/11/2024 0622    LDL 81 03/11/2024 0622      Diabetes Screening:   Lab Results   Component Value Date/Time    HBA1C 5.4 08/23/2021 11:17 AM       AAA Screening (65 to 74 year male): n/a   Aspirin Use: n/a    Below anticipatory guidance discussed with patient  Diet:  counseled regarding eating healthy diet  Exercise: Counseled regarding exercise 150 minutes in a week    Cancer screening  Colorectal Cancer Screening: Up-to-date  Lung Cancer Screening: She does not smoke  Cervical Cancer Screening: Aged out  Breast Cancer Screening: Ordered mammogram    Infectious disease screening/Immunizations  --STI  "Screening: None  --Immunizations: Declined       Review of Systems   Constitutional:  Negative for chills, fever and malaise/fatigue.   HENT:  Negative for ear discharge, ear pain, hearing loss, sinus pain and sore throat.    Eyes:  Negative for pain, discharge and redness.   Respiratory:  Negative for cough, hemoptysis, sputum production, shortness of breath and wheezing.    Cardiovascular:  Negative for chest pain, palpitations and leg swelling.   Gastrointestinal:  Negative for abdominal pain, constipation, diarrhea, nausea and vomiting.   Genitourinary:  Negative for dysuria, frequency and urgency.   Musculoskeletal:  Negative for joint pain and myalgias.   Skin:  Negative for itching and rash.   Neurological:  Negative for dizziness, sensory change, focal weakness and headaches.   Psychiatric/Behavioral:  Negative for depression. The patient is not nervous/anxious.             Current Outpatient Medications   Medication Sig Dispense Refill    rosuvastatin (CRESTOR) 10 MG Tab TAKE 1 TABLET BY MOUTH ONCE DAILY IN THE EVENING 90 Tablet 1    amlodipine-benazepril (LOTREL) 2.5-10 MG oral capsule Take 1 capsule by mouth once daily 90 Capsule 3     No current facility-administered medications for this visit.        No Known Allergies      /60   Pulse 60   Temp 36.3 °C (97.4 °F) (Temporal)   Ht 1.626 m (5' 4\")   Wt 94.5 kg (208 lb 5.4 oz)   SpO2 98%  Body mass index is 35.76 kg/m².      Physical Exam  Constitutional:       Appearance: Normal appearance. She is well-developed and well-groomed.   HENT:      Head: Normocephalic and atraumatic.      Right Ear: Tympanic membrane, ear canal and external ear normal.      Left Ear: Tympanic membrane, ear canal and external ear normal.      Nose: Nose normal.      Mouth/Throat:      Mouth: Mucous membranes are moist.      Pharynx: No oropharyngeal exudate or posterior oropharyngeal erythema.   Eyes:      General:         Right eye: No discharge.         Left eye: No " discharge.      Conjunctiva/sclera: Conjunctivae normal.   Neck:      Thyroid: No thyromegaly.   Cardiovascular:      Rate and Rhythm: Normal rate and regular rhythm.      Heart sounds: Normal heart sounds. No murmur heard.  Pulmonary:      Effort: Pulmonary effort is normal. No respiratory distress.      Breath sounds: Normal breath sounds. No wheezing or rales.   Abdominal:      General: There is no distension.   Musculoskeletal:         General: Normal range of motion.      Cervical back: Neck supple.      Right lower leg: No edema.      Left lower leg: No edema.   Skin:     General: Skin is warm.      Findings: No rash.   Neurological:      General: No focal deficit present.      Mental Status: She is alert. Mental status is at baseline.      Gait: Gait is intact.   Psychiatric:         Mood and Affect: Mood and affect normal.         Behavior: Behavior normal.            Results  Imaging  Bone scan conducted last year was normal.         Annual physical covers screening, immunizations,counseling.  Other problem list that was addressed during this visit including diagnostic labs, diagnostic imagings, medications will be billed as a regular visit which can have copay per patient insurance.       Please note that this dictation was created using voice recognition software. I have made every reasonable attempt to correct obvious errors, but I expect that there are errors of grammar and possibly content that I did not discover before finalizing the note.

## 2025-04-04 ENCOUNTER — HOSPITAL ENCOUNTER (OUTPATIENT)
Dept: RADIOLOGY | Facility: MEDICAL CENTER | Age: 65
End: 2025-04-04
Attending: FAMILY MEDICINE
Payer: COMMERCIAL

## 2025-04-04 ENCOUNTER — RESULTS FOLLOW-UP (OUTPATIENT)
Dept: MEDICAL GROUP | Facility: MEDICAL CENTER | Age: 65
End: 2025-04-04

## 2025-04-04 DIAGNOSIS — Z12.31 ENCOUNTER FOR SCREENING MAMMOGRAM FOR BREAST CANCER: ICD-10-CM

## 2025-04-04 PROCEDURE — 77067 SCR MAMMO BI INCL CAD: CPT

## 2025-04-11 DIAGNOSIS — I10 PRIMARY HYPERTENSION: ICD-10-CM

## 2025-04-11 RX ORDER — AMLODIPINE BESYLATE AND BENAZEPRIL HYDROCHLORIDE 2.5; 1 MG/1; MG/1
1 CAPSULE ORAL DAILY
Qty: 90 CAPSULE | Refills: 3 | Status: SHIPPED | OUTPATIENT
Start: 2025-04-11

## 2025-04-11 NOTE — TELEPHONE ENCOUNTER
Received request via: Pharmacy    Was the patient seen in the last year in this department? Yes    Does the patient have an active prescription (recently filled or refills available) for medication(s) requested? No    Pharmacy Name: [facility]    Does the patient have [facility] Plus and need 100-day supply? (This applies to ALL medications) Patient does not have SCP

## 2025-04-14 ENCOUNTER — HOSPITAL ENCOUNTER (OUTPATIENT)
Dept: LAB | Facility: MEDICAL CENTER | Age: 65
End: 2025-04-14
Attending: FAMILY MEDICINE
Payer: COMMERCIAL

## 2025-04-14 ENCOUNTER — RESULTS FOLLOW-UP (OUTPATIENT)
Dept: MEDICAL GROUP | Facility: MEDICAL CENTER | Age: 65
End: 2025-04-14

## 2025-04-14 DIAGNOSIS — I10 PRIMARY HYPERTENSION: ICD-10-CM

## 2025-04-14 DIAGNOSIS — E78.2 MIXED HYPERLIPIDEMIA: ICD-10-CM

## 2025-04-14 LAB
ALBUMIN SERPL BCP-MCNC: 4.1 G/DL (ref 3.2–4.9)
ALBUMIN/GLOB SERPL: 1.2 G/DL
ALP SERPL-CCNC: 55 U/L (ref 30–99)
ALT SERPL-CCNC: 16 U/L (ref 2–50)
ANION GAP SERPL CALC-SCNC: 10 MMOL/L (ref 7–16)
AST SERPL-CCNC: 29 U/L (ref 12–45)
BILIRUB SERPL-MCNC: 0.4 MG/DL (ref 0.1–1.5)
BUN SERPL-MCNC: 15 MG/DL (ref 8–22)
CALCIUM ALBUM COR SERPL-MCNC: 9.5 MG/DL (ref 8.5–10.5)
CALCIUM SERPL-MCNC: 9.6 MG/DL (ref 8.5–10.5)
CHLORIDE SERPL-SCNC: 109 MMOL/L (ref 96–112)
CHOLEST SERPL-MCNC: 141 MG/DL (ref 100–199)
CO2 SERPL-SCNC: 25 MMOL/L (ref 20–33)
CREAT SERPL-MCNC: 1.13 MG/DL (ref 0.5–1.4)
EST. AVERAGE GLUCOSE BLD GHB EST-MCNC: 123 MG/DL
FASTING STATUS PATIENT QL REPORTED: NORMAL
GFR SERPLBLD CREATININE-BSD FMLA CKD-EPI: 53 ML/MIN/1.73 M 2
GLOBULIN SER CALC-MCNC: 3.4 G/DL (ref 1.9–3.5)
GLUCOSE SERPL-MCNC: 88 MG/DL (ref 65–99)
HBA1C MFR BLD: 5.9 % (ref 4–5.6)
HDLC SERPL-MCNC: 44 MG/DL
LDLC SERPL CALC-MCNC: 81 MG/DL
POTASSIUM SERPL-SCNC: 5 MMOL/L (ref 3.6–5.5)
PROT SERPL-MCNC: 7.5 G/DL (ref 6–8.2)
SODIUM SERPL-SCNC: 144 MMOL/L (ref 135–145)
T4 FREE SERPL-MCNC: 1.19 NG/DL (ref 0.93–1.7)
TRIGL SERPL-MCNC: 80 MG/DL (ref 0–149)
TSH SERPL-ACNC: 2.25 UIU/ML (ref 0.38–5.33)

## 2025-04-14 PROCEDURE — 83036 HEMOGLOBIN GLYCOSYLATED A1C: CPT

## 2025-04-14 PROCEDURE — 36415 COLL VENOUS BLD VENIPUNCTURE: CPT

## 2025-04-14 PROCEDURE — 80053 COMPREHEN METABOLIC PANEL: CPT

## 2025-04-14 PROCEDURE — 84439 ASSAY OF FREE THYROXINE: CPT

## 2025-04-14 PROCEDURE — 84443 ASSAY THYROID STIM HORMONE: CPT

## 2025-04-14 PROCEDURE — 80061 LIPID PANEL: CPT

## 2025-06-13 ENCOUNTER — OFFICE VISIT (OUTPATIENT)
Dept: MEDICAL GROUP | Facility: MEDICAL CENTER | Age: 65
End: 2025-06-13
Payer: COMMERCIAL

## 2025-06-13 VITALS
HEART RATE: 60 BPM | TEMPERATURE: 99.3 F | OXYGEN SATURATION: 96 % | WEIGHT: 207.01 LBS | BODY MASS INDEX: 35.34 KG/M2 | HEIGHT: 64 IN | DIASTOLIC BLOOD PRESSURE: 80 MMHG | SYSTOLIC BLOOD PRESSURE: 130 MMHG

## 2025-06-13 DIAGNOSIS — N18.31 STAGE 3A CHRONIC KIDNEY DISEASE: Primary | ICD-10-CM

## 2025-06-13 DIAGNOSIS — I10 PRIMARY HYPERTENSION: ICD-10-CM

## 2025-06-13 DIAGNOSIS — R73.03 PREDIABETES: ICD-10-CM

## 2025-06-13 PROCEDURE — 99214 OFFICE O/P EST MOD 30 MIN: CPT | Performed by: FAMILY MEDICINE

## 2025-06-13 PROCEDURE — 3075F SYST BP GE 130 - 139MM HG: CPT | Performed by: FAMILY MEDICINE

## 2025-06-13 PROCEDURE — 3079F DIAST BP 80-89 MM HG: CPT | Performed by: FAMILY MEDICINE

## 2025-06-13 ASSESSMENT — ENCOUNTER SYMPTOMS
FEVER: 0
CHILLS: 0
PALPITATIONS: 0

## 2025-06-13 NOTE — PROGRESS NOTES
Verbal consent was acquired by the patient to use [employer] ambient listening note generation during this visit.      Trevon was seen today for follow-up.    Diagnoses and all orders for this visit:    Stage 3a chronic kidney disease  -     Basic Metabolic Panel; Future  -     URINALYSIS; Future  -     MICROALBUMIN CREAT RATIO URINE; Future  -     US-RENAL; Future    Prediabetes  -     HEMOGLOBIN A1C; Future    Primary hypertension                  Assessment & Plan  1. Prediabetes:  New condition, unstable  - Increased A1c level to 5.9, indicating prediabetes.  - Advised to eliminate processed foods and maintain consistent carbohydrate intake.  - Recheck of A1c level will be conducted.    2.  CKD stage IIIa  Chronic condition, unstable  - Kidney function has shown improvement but has subsequently declined.  - High blood pressure and high sugar can affect the kidneys.  - Comprehensive metabolic panel (CMP), urinalysis, and microalbumin test will be performed.  - Kidney ultrasound will be ordered to check for underlying issues such as kidney stones.    3.  Hypertension  Chronic condition, stable, continue amlodipine benazepril combination.    Follow-up in 3 months for lab follow-up.            Chief complaint::The primary encounter diagnosis was Stage 3a chronic kidney disease. Diagnoses of Prediabetes and Primary hypertension were also pertinent to this visit.      History of Present Illness  The patient is a 64-year-old female who presents for a blood test follow-up.    Hydration  - She reports a daily intake of  64-ounce bottles of water at her workplace.    Medication Use  - She rarely uses ibuprofen or Aleve.    Medical History  - She has no history of kidney stones.    Diet  - She does not consume excessive amounts of sugar.    FAMILY HISTORY  She does not have a family history of kidney disease.      Review of Systems   Constitutional:  Negative for chills and fever.   Cardiovascular:  Negative for chest  "pain, palpitations and leg swelling.          Medications and Allergies:       Current Outpatient Medications:     amlodipine-benazepril, 1 Capsule, Oral, DAILY, Taking    rosuvastatin, 10 mg, Oral, Q EVENING, Taking     /80   Pulse 60   Temp 37.4 °C (99.3 °F) (Temporal)   Ht 1.613 m (5' 3.5\")   Wt 93.9 kg (207 lb 0.2 oz)   SpO2 96% , Body mass index is 36.1 kg/m².      Physical Exam  Constitutional:       Appearance: Normal appearance. She is well-developed and well-groomed.   HENT:      Head: Normocephalic and atraumatic.      Right Ear: External ear normal.      Left Ear: External ear normal.   Eyes:      General:         Right eye: No discharge.         Left eye: No discharge.      Conjunctiva/sclera: Conjunctivae normal.   Cardiovascular:      Rate and Rhythm: Normal rate.   Pulmonary:      Effort: Pulmonary effort is normal. No respiratory distress.   Musculoskeletal:      Cervical back: Neck supple.   Skin:     Findings: No rash.   Neurological:      Mental Status: She is alert.   Psychiatric:         Mood and Affect: Mood and affect normal.         Behavior: Behavior normal.            I reviewed with patient lab results resulted on 4/14/2025.        Please note that this dictation was created using voice recognition software. I have made every reasonable attempt to correct obvious errors, but I expect that there are errors of grammar and possibly content that I did not discover before finalizing the note.      "

## 2025-07-11 ENCOUNTER — HOSPITAL ENCOUNTER (OUTPATIENT)
Dept: LAB | Facility: MEDICAL CENTER | Age: 65
End: 2025-07-11
Attending: FAMILY MEDICINE
Payer: COMMERCIAL

## 2025-07-11 DIAGNOSIS — N18.31 STAGE 3A CHRONIC KIDNEY DISEASE: ICD-10-CM

## 2025-07-11 DIAGNOSIS — R73.03 PREDIABETES: ICD-10-CM

## 2025-07-11 LAB
ANION GAP SERPL CALC-SCNC: 10 MMOL/L (ref 7–16)
APPEARANCE UR: CLEAR
BACTERIA #/AREA URNS HPF: ABNORMAL /HPF
BILIRUB UR QL STRIP.AUTO: NEGATIVE
BUN SERPL-MCNC: 11 MG/DL (ref 8–22)
CALCIUM SERPL-MCNC: 9.4 MG/DL (ref 8.5–10.5)
CASTS URNS QL MICRO: ABNORMAL /LPF (ref 0–2)
CHLORIDE SERPL-SCNC: 106 MMOL/L (ref 96–112)
CO2 SERPL-SCNC: 26 MMOL/L (ref 20–33)
COLOR UR: YELLOW
CREAT SERPL-MCNC: 1.16 MG/DL (ref 0.5–1.4)
CREAT UR-MCNC: 113 MG/DL
EPITHELIAL CELLS 1715: ABNORMAL /HPF (ref 0–5)
EST. AVERAGE GLUCOSE BLD GHB EST-MCNC: 111 MG/DL
GFR SERPLBLD CREATININE-BSD FMLA CKD-EPI: 52 ML/MIN/1.73 M 2
GLUCOSE SERPL-MCNC: 89 MG/DL (ref 65–99)
GLUCOSE UR STRIP.AUTO-MCNC: NEGATIVE MG/DL
HBA1C MFR BLD: 5.5 % (ref 4–5.6)
KETONES UR STRIP.AUTO-MCNC: NEGATIVE MG/DL
LEUKOCYTE ESTERASE UR QL STRIP.AUTO: ABNORMAL
MICRO URNS: ABNORMAL
MICROALBUMIN UR-MCNC: <1.2 MG/DL
MICROALBUMIN/CREAT UR: NORMAL MG/G (ref 0–30)
NITRITE UR QL STRIP.AUTO: NEGATIVE
PH UR STRIP.AUTO: 6 [PH] (ref 5–8)
POTASSIUM SERPL-SCNC: 5.1 MMOL/L (ref 3.6–5.5)
PROT UR QL STRIP: NEGATIVE MG/DL
RBC # URNS HPF: ABNORMAL /HPF (ref 0–2)
RBC UR QL AUTO: NEGATIVE
SODIUM SERPL-SCNC: 142 MMOL/L (ref 135–145)
SP GR UR STRIP.AUTO: 1.01
UROBILINOGEN UR STRIP.AUTO-MCNC: 1 EU/DL
WBC #/AREA URNS HPF: ABNORMAL /HPF

## 2025-07-11 PROCEDURE — 82570 ASSAY OF URINE CREATININE: CPT

## 2025-07-11 PROCEDURE — 83036 HEMOGLOBIN GLYCOSYLATED A1C: CPT

## 2025-07-11 PROCEDURE — 81001 URINALYSIS AUTO W/SCOPE: CPT

## 2025-07-11 PROCEDURE — 82043 UR ALBUMIN QUANTITATIVE: CPT

## 2025-07-11 PROCEDURE — 36415 COLL VENOUS BLD VENIPUNCTURE: CPT

## 2025-07-11 PROCEDURE — 80048 BASIC METABOLIC PNL TOTAL CA: CPT

## 2025-07-15 DIAGNOSIS — E78.2 MIXED HYPERLIPIDEMIA: ICD-10-CM

## 2025-07-15 RX ORDER — ROSUVASTATIN CALCIUM 10 MG/1
10 TABLET, COATED ORAL EVERY EVENING
Qty: 90 TABLET | Refills: 3 | Status: SHIPPED | OUTPATIENT
Start: 2025-07-15

## 2025-08-12 ENCOUNTER — HOSPITAL ENCOUNTER (OUTPATIENT)
Dept: RADIOLOGY | Facility: MEDICAL CENTER | Age: 65
End: 2025-08-12
Attending: FAMILY MEDICINE
Payer: COMMERCIAL

## 2025-08-12 DIAGNOSIS — N18.31 STAGE 3A CHRONIC KIDNEY DISEASE: ICD-10-CM

## 2025-08-12 PROCEDURE — 76775 US EXAM ABDO BACK WALL LIM: CPT
